# Patient Record
Sex: FEMALE | Race: WHITE | NOT HISPANIC OR LATINO | Employment: FULL TIME | URBAN - METROPOLITAN AREA
[De-identification: names, ages, dates, MRNs, and addresses within clinical notes are randomized per-mention and may not be internally consistent; named-entity substitution may affect disease eponyms.]

---

## 2017-01-04 ENCOUNTER — COMMUNICATION - HEALTHEAST (OUTPATIENT)
Dept: TELEHEALTH | Facility: CLINIC | Age: 32
End: 2017-01-04

## 2017-02-16 ENCOUNTER — COMMUNICATION - HEALTHEAST (OUTPATIENT)
Dept: FAMILY MEDICINE | Facility: CLINIC | Age: 32
End: 2017-02-16

## 2017-02-16 DIAGNOSIS — K21.00 REFLUX ESOPHAGITIS: ICD-10-CM

## 2017-04-18 ENCOUNTER — OFFICE VISIT - HEALTHEAST (OUTPATIENT)
Dept: FAMILY MEDICINE | Facility: CLINIC | Age: 32
End: 2017-04-18

## 2017-04-18 DIAGNOSIS — Z13.220 SCREENING, LIPID: ICD-10-CM

## 2017-04-18 DIAGNOSIS — E04.9 GOITER, NODULAR: ICD-10-CM

## 2017-04-18 DIAGNOSIS — E55.9 VITAMIN D DEFICIENCY: ICD-10-CM

## 2017-04-18 DIAGNOSIS — Z13.1 SCREENING FOR DIABETES MELLITUS: ICD-10-CM

## 2017-04-18 NOTE — ASSESSMENT & PLAN NOTE
Follow up ultrasound: Small subcentimeter nodules are well circumscribed and measure slightly smaller than on the prior study. No dominant nodule greater than a centimeter - improved.

## 2017-04-27 ENCOUNTER — HOSPITAL ENCOUNTER (OUTPATIENT)
Dept: ULTRASOUND IMAGING | Facility: CLINIC | Age: 32
Discharge: HOME | End: 2017-04-27
Attending: FAMILY MEDICINE

## 2017-04-27 DIAGNOSIS — E04.9 GOITER, NODULAR: ICD-10-CM

## 2017-05-02 ENCOUNTER — COMMUNICATION - HEALTHEAST (OUTPATIENT)
Dept: FAMILY MEDICINE | Facility: CLINIC | Age: 32
End: 2017-05-02

## 2017-05-15 ENCOUNTER — COMMUNICATION - HEALTHEAST (OUTPATIENT)
Dept: FAMILY MEDICINE | Facility: CLINIC | Age: 32
End: 2017-05-15

## 2017-05-15 DIAGNOSIS — K21.00 REFLUX ESOPHAGITIS: ICD-10-CM

## 2017-05-17 ENCOUNTER — OFFICE VISIT - HEALTHEAST (OUTPATIENT)
Dept: FAMILY MEDICINE | Facility: CLINIC | Age: 32
End: 2017-05-17

## 2017-05-17 ENCOUNTER — RECORDS - HEALTHEAST (OUTPATIENT)
Dept: ADMINISTRATIVE | Facility: OTHER | Age: 32
End: 2017-05-17

## 2017-05-17 DIAGNOSIS — Z13.228 SCREENING FOR METABOLIC DISORDER: ICD-10-CM

## 2017-05-17 DIAGNOSIS — Z13.21 SCREENING FOR ENDOCRINE, NUTRITIONAL, METABOLIC AND IMMUNITY DISORDER: ICD-10-CM

## 2017-05-17 DIAGNOSIS — Z13.220 SCREENING, LIPID: ICD-10-CM

## 2017-05-17 DIAGNOSIS — Z13.29 SCREENING FOR THYROID DISORDER: ICD-10-CM

## 2017-05-17 DIAGNOSIS — Z13.0 SCREENING FOR ENDOCRINE, NUTRITIONAL, METABOLIC AND IMMUNITY DISORDER: ICD-10-CM

## 2017-05-17 DIAGNOSIS — E55.9 VITAMIN D DEFICIENCY: ICD-10-CM

## 2017-05-17 DIAGNOSIS — Z13.228 SCREENING FOR ENDOCRINE, NUTRITIONAL, METABOLIC AND IMMUNITY DISORDER: ICD-10-CM

## 2017-05-17 DIAGNOSIS — Z13.29 SCREENING FOR ENDOCRINE, NUTRITIONAL, METABOLIC AND IMMUNITY DISORDER: ICD-10-CM

## 2017-05-17 DIAGNOSIS — K21.00 REFLUX ESOPHAGITIS: ICD-10-CM

## 2017-05-17 DIAGNOSIS — Z13.1 SCREENING FOR DIABETES MELLITUS: ICD-10-CM

## 2017-05-17 LAB
ALT SERPL-CCNC: 10 U/L (ref 0–45)
AST SERPL-CCNC: 13 U/L (ref 0–40)
CHOLEST SERPL-MCNC: 199 MG/DL
CREAT SERPL-MCNC: 0.81 MG/DL (ref 0.6–1.1)
FASTING STATUS PATIENT QL REPORTED: NORMAL
GFR SERPL CREATININE-BSD FRML MDRD: >60 ML/MIN/1.73M2
GLUCOSE SERPL-MCNC: 86 MG/DL (ref 70–125)
HBA1C MFR BLD: 5.2 % (ref 3.5–6)
HDLC SERPL-MCNC: 54 MG/DL
LDLC SERPL CALC-MCNC: 127 MG/DL
POTASSIUM SERPL-SCNC: 4.2 MMOL/L (ref 3.5–5)
TRIGL SERPL-MCNC: 90 MG/DL

## 2017-05-17 ASSESSMENT — MIFFLIN-ST. JEOR: SCORE: 1511.65

## 2017-06-19 ENCOUNTER — COMMUNICATION - HEALTHEAST (OUTPATIENT)
Dept: FAMILY MEDICINE | Facility: CLINIC | Age: 32
End: 2017-06-19

## 2017-06-22 ENCOUNTER — OFFICE VISIT - HEALTHEAST (OUTPATIENT)
Dept: FAMILY MEDICINE | Facility: CLINIC | Age: 32
End: 2017-06-22

## 2017-06-22 DIAGNOSIS — K21.00 REFLUX ESOPHAGITIS: ICD-10-CM

## 2017-06-22 ASSESSMENT — MIFFLIN-ST. JEOR: SCORE: 1502.12

## 2017-07-19 ENCOUNTER — OFFICE VISIT - HEALTHEAST (OUTPATIENT)
Dept: FAMILY MEDICINE | Facility: CLINIC | Age: 32
End: 2017-07-19

## 2017-07-19 DIAGNOSIS — K21.00 REFLUX ESOPHAGITIS: ICD-10-CM

## 2017-07-20 ENCOUNTER — OFFICE VISIT - HEALTHEAST (OUTPATIENT)
Dept: FAMILY MEDICINE | Facility: CLINIC | Age: 32
End: 2017-07-20

## 2017-07-20 DIAGNOSIS — F42.9 OCD (OBSESSIVE COMPULSIVE DISORDER): ICD-10-CM

## 2017-07-20 NOTE — ASSESSMENT & PLAN NOTE
Mary Manuel is a 31 y.o. female who describes feeling constantly consumed with watching the time and making sure she gets her to do list done.  She says sometimes that things have her to do list are completely ridiculous and she still becomes obsessed.  For example recently she wanted to get some new blinds in her bedroom and spent 4 hours on her phone looking for the perfect blinds.  She notices that oftentimes (but not always) her obsessions are surrounding shopping.  She feels like this is really hindering her life because she does not feel like playing with her kids or interacting with her family because she is always got something pressing on her to do list even though she feels like logically these things should not be pressing issues.    Discussed meds vs CBT.   She wanted to start with meds due to time and financial constraints.   Start lexapro 10 mg po q day. #30 - 1 rf  Follow up in one month to titrate treatment.

## 2017-08-23 ENCOUNTER — OFFICE VISIT - HEALTHEAST (OUTPATIENT)
Dept: FAMILY MEDICINE | Facility: CLINIC | Age: 32
End: 2017-08-23

## 2017-08-23 DIAGNOSIS — K21.00 REFLUX ESOPHAGITIS: ICD-10-CM

## 2017-08-23 DIAGNOSIS — F42.9 OCD (OBSESSIVE COMPULSIVE DISORDER): ICD-10-CM

## 2017-08-23 ASSESSMENT — MIFFLIN-ST. JEOR: SCORE: 1492.14

## 2017-10-05 ENCOUNTER — COMMUNICATION - HEALTHEAST (OUTPATIENT)
Dept: FAMILY MEDICINE | Facility: CLINIC | Age: 32
End: 2017-10-05

## 2017-10-31 ENCOUNTER — OFFICE VISIT - HEALTHEAST (OUTPATIENT)
Dept: FAMILY MEDICINE | Facility: CLINIC | Age: 32
End: 2017-10-31

## 2017-10-31 DIAGNOSIS — K21.00 REFLUX ESOPHAGITIS: ICD-10-CM

## 2017-10-31 DIAGNOSIS — F42.2 MIXED OBSESSIONAL THOUGHTS AND ACTS: ICD-10-CM

## 2017-10-31 NOTE — ASSESSMENT & PLAN NOTE
Feels like lexapro 10 mg daily works well for her.  She is sleeping better, she feels less like compulsively eating and is wondering if she can increase the dose as she is having no side effects.    I will prescribe Lexapro 20 mg orally per day.  Quantity 90 with no refills    Also we discussed using NAC also known as N-acetylcysteine which is known to help with compulsive behaviorsand is found over-the-counter.

## 2017-10-31 NOTE — ASSESSMENT & PLAN NOTE
Initial Weight: 192 lbs  Weight: 179 lb (81.2 kg)  Weight loss from initial: 13  % Weight loss: 6.77 %     Her weight has been plateaued for quite some time.  She has been afraid to get off of the phentermine but I think she is ready now so we will wean down to 5/7 days per week.  We are starting to wean today on October 31, 2017.    She is still afraid to eat healthy fats such as butter etc. so she is avoiding those.  I have reiterated that she will feel extremely hungry if she is not eating healthy fats to stay satiated.  She seemed to understand this and will try to implement this.    CAP PLAN - weight daily.  Control less than or equal to 180  Action 181-184  Panic 185

## 2017-10-31 NOTE — ASSESSMENT & PLAN NOTE
She still getting daily symptoms if she forgets to take her Protonix by 9 AM.  Continue Protonix orally 40 mg per day.

## 2018-01-18 ENCOUNTER — COMMUNICATION - HEALTHEAST (OUTPATIENT)
Dept: FAMILY MEDICINE | Facility: CLINIC | Age: 33
End: 2018-01-18

## 2018-01-18 DIAGNOSIS — K21.00 REFLUX ESOPHAGITIS: ICD-10-CM

## 2018-03-10 ENCOUNTER — COMMUNICATION - HEALTHEAST (OUTPATIENT)
Dept: FAMILY MEDICINE | Facility: CLINIC | Age: 33
End: 2018-03-10

## 2018-04-09 ENCOUNTER — RECORDS - HEALTHEAST (OUTPATIENT)
Dept: ADMINISTRATIVE | Facility: OTHER | Age: 33
End: 2018-04-09

## 2018-04-19 ENCOUNTER — RECORDS - HEALTHEAST (OUTPATIENT)
Dept: ADMINISTRATIVE | Facility: OTHER | Age: 33
End: 2018-04-19

## 2018-05-24 ENCOUNTER — RECORDS - HEALTHEAST (OUTPATIENT)
Dept: ADMINISTRATIVE | Facility: OTHER | Age: 33
End: 2018-05-24

## 2018-06-04 ENCOUNTER — OFFICE VISIT - HEALTHEAST (OUTPATIENT)
Dept: FAMILY MEDICINE | Facility: CLINIC | Age: 33
End: 2018-06-04

## 2018-06-04 ENCOUNTER — COMMUNICATION - HEALTHEAST (OUTPATIENT)
Dept: SCHEDULING | Facility: CLINIC | Age: 33
End: 2018-06-04

## 2018-06-04 DIAGNOSIS — J32.9 SINUSITIS: ICD-10-CM

## 2018-06-04 ASSESSMENT — MIFFLIN-ST. JEOR: SCORE: 1615.52

## 2018-06-05 ENCOUNTER — COMMUNICATION - HEALTHEAST (OUTPATIENT)
Dept: FAMILY MEDICINE | Facility: CLINIC | Age: 33
End: 2018-06-05

## 2018-06-07 ENCOUNTER — COMMUNICATION - HEALTHEAST (OUTPATIENT)
Dept: FAMILY MEDICINE | Facility: CLINIC | Age: 33
End: 2018-06-07

## 2018-06-07 DIAGNOSIS — F42.9 OBSESSIVE COMPULSIVE DISORDER: ICD-10-CM

## 2018-06-26 ENCOUNTER — RECORDS - HEALTHEAST (OUTPATIENT)
Dept: ADMINISTRATIVE | Facility: OTHER | Age: 33
End: 2018-06-26

## 2018-07-17 ENCOUNTER — OFFICE VISIT - HEALTHEAST (OUTPATIENT)
Dept: FAMILY MEDICINE | Facility: CLINIC | Age: 33
End: 2018-07-17

## 2018-07-17 DIAGNOSIS — F50.812 SEVERE BINGE-EATING DISORDER: ICD-10-CM

## 2018-07-17 DIAGNOSIS — Z13.228 SCREENING FOR METABOLIC DISORDER: ICD-10-CM

## 2018-07-17 DIAGNOSIS — E66.811 CLASS 1 DRUG-INDUCED OBESITY WITH SERIOUS COMORBIDITY AND BODY MASS INDEX (BMI) OF 34.0 TO 34.9 IN ADULT: ICD-10-CM

## 2018-07-17 DIAGNOSIS — Z13.1 SCREENING FOR DIABETES MELLITUS: ICD-10-CM

## 2018-07-17 DIAGNOSIS — Z13.220 SCREENING, LIPID: ICD-10-CM

## 2018-07-17 DIAGNOSIS — Z13.0 SCREENING, ANEMIA, DEFICIENCY, IRON: ICD-10-CM

## 2018-07-17 DIAGNOSIS — E66.1 CLASS 1 DRUG-INDUCED OBESITY WITH SERIOUS COMORBIDITY AND BODY MASS INDEX (BMI) OF 34.0 TO 34.9 IN ADULT: ICD-10-CM

## 2018-07-17 DIAGNOSIS — E55.9 VITAMIN D DEFICIENCY: ICD-10-CM

## 2018-07-17 DIAGNOSIS — Z13.29 SCREENING FOR THYROID DISORDER: ICD-10-CM

## 2018-07-17 ASSESSMENT — MIFFLIN-ST. JEOR: SCORE: 1629.82

## 2018-07-17 NOTE — ASSESSMENT & PLAN NOTE
LEXAPRO 20 mg orally per day -anxiety and obsessive-compulsive disorder  7/2017-7/2018-weight gain of 30 pounds  Dc 7/17/2018     ZOLOFT 25 mg orally per day-anxiety and OCD may increase to 50 mg after the first week  I would like to use this medication instead of Lexapro due to weight gain on Lexapro.  Left also has a known indication for binge eating disorder.    Eventually I might like to add Vyvanse which is also known to be helpful for binge eating disorder.

## 2018-07-17 NOTE — ASSESSMENT & PLAN NOTE
Dx: BMI 34.63, in the setting of multiple weight related comorbid conditions including: Chronic reflux esophagitis, severe binge eating disorder, and vitamin D deficiency    Initial Weight: 208.1 lbs bmi 34.63, 7/17/2018 (restart)  Weight: 208 lb 1.6 oz 7/17/2018   Weight loss from initial: 0  % Weight loss: 0 %  Body Fat %: 44.2       Are you experiencing any side effects to the medications:  Yes gaining weight since starting lexapro 7/2017.   Hunger control:Poor.  She says that every night at 9 PM she is eating everything she can find -binging nightly.  She says she will eat more than 1000 cass in the evenings.  Otherwise she is actually eating fairly well.  Exercise was discussed: She is regularly exercising.  Taking supplements: Not discussed due to time constraints.  Discussed journaling food: She is doing this intermittently.  Patient is pleased with the current results: No  The patient is following the nutrition plan: Yes during the day but in the evenings she is binging and that is making it so that which she does during the day is not helpful  Barriers to losing weight:    lexapro -she has gained 30 pounds since starting Lexapro in July 2017.    PLAN    Mary tells me that despite trying really hard to eat healthy and exercising she continues to have difficulty with her weight.  Got to the point that she feels she can no longer control it at all.  She notes that every night she is eating whatever she can get her hands on and sometimes this is more than her daily need for calories.  Because of this we have a new diagnosis of binge eating disorder today.    I plan for her is toget her off the Lexapro because I think the Lexapro is causing significant weight gain.  She has gained 30 pounds since she started it.  However, she continues to have anxiety and OCD type symptoms that are improved by the Lexapro so I have recommended we start Zoloft instead.  She is to start the Zoloft tomorrow evening at 25 mg and  increase as tolerated to 50 mg daily.  We will visit again in 1 month and if she is stable on the Lexapro we might consider adding Wellbutrin, Vyvanse, and or N-acetylcysteine to this regimen.  We could also use phentermine but she has had mixed results with that in the past.    Because of the significant weight gain of 30 pounds I have recommended also re-evaluating laboratory studies to look for thyroid abnormalities, metabolic syndrome and prediabetes.    LEXAPRO 20 mg orally per day -anxiety and obsessive-compulsive disorder  7/2017-7/2018-weight gain of 30 pounds  Dc 7/17/2018    ZOLOFT 25 mg orally per day-anxiety and OCD may increase to 50 mg after the first week  I would like to use this medication instead of Lexapro due to weight gain on Lexapro.    PHENTERMINE 37.5 MG PO Q DAY - For weight loss  4/19/2016-9/2016-initially lost 15 pounds but then even though she was still taking the phentermine she gained 7 pounds.  9/2016-12/2016 she did not take phentermine and gained 10 pounds during this time  1/2017 -4/2017 -she did not take phentermine and lost 15 pounds  4/2017 - 11/2017 -weight was stable and went up and down 5 pounds despite the use of phentermine during this time  11/2017 -7/2018-gained 30 pounds probably due to use of Lexapro during this time  7/2018-we will consider use of phentermine again in the future but at this time I would like to start Zoloft and discontinue Lexapro to see how she does once she stabilized on the Zoloft we can consider adding or changing medications.    Follow up in one month.

## 2018-07-18 ENCOUNTER — AMBULATORY - HEALTHEAST (OUTPATIENT)
Dept: LAB | Facility: CLINIC | Age: 33
End: 2018-07-18

## 2018-07-18 DIAGNOSIS — E55.9 VITAMIN D DEFICIENCY: ICD-10-CM

## 2018-07-18 DIAGNOSIS — Z13.1 SCREENING FOR DIABETES MELLITUS: ICD-10-CM

## 2018-07-18 DIAGNOSIS — Z13.29 SCREENING FOR THYROID DISORDER: ICD-10-CM

## 2018-07-18 DIAGNOSIS — Z13.0 SCREENING, ANEMIA, DEFICIENCY, IRON: ICD-10-CM

## 2018-07-18 DIAGNOSIS — Z13.228 SCREENING FOR METABOLIC DISORDER: ICD-10-CM

## 2018-07-18 DIAGNOSIS — Z13.220 SCREENING, LIPID: ICD-10-CM

## 2018-07-18 LAB
ALBUMIN SERPL-MCNC: 3.6 G/DL (ref 3.5–5)
ALP SERPL-CCNC: 41 U/L (ref 45–120)
ALT SERPL W P-5'-P-CCNC: 11 U/L (ref 0–45)
ALT SERPL-CCNC: 11 U/L (ref 0–45)
ANION GAP SERPL CALCULATED.3IONS-SCNC: 9 MMOL/L (ref 5–18)
AST SERPL W P-5'-P-CCNC: 18 U/L (ref 0–40)
AST SERPL-CCNC: 18 U/L (ref 0–40)
BASOPHILS # BLD AUTO: 0 THOU/UL (ref 0–0.2)
BASOPHILS NFR BLD AUTO: 1 % (ref 0–2)
BILIRUB SERPL-MCNC: 0.5 MG/DL (ref 0–1)
BUN SERPL-MCNC: 14 MG/DL (ref 8–22)
CALCIUM SERPL-MCNC: 9.3 MG/DL (ref 8.5–10.5)
CHLORIDE BLD-SCNC: 107 MMOL/L (ref 98–107)
CHOLEST SERPL-MCNC: 183 MG/DL
CO2 SERPL-SCNC: 24 MMOL/L (ref 22–31)
CREAT SERPL-MCNC: 0.81 MG/DL (ref 0.6–1.1)
CREAT SERPL-MCNC: 0.81 MG/DL (ref 0.6–1.1)
EOSINOPHIL # BLD AUTO: 0.1 THOU/UL (ref 0–0.4)
EOSINOPHIL NFR BLD AUTO: 2 % (ref 0–6)
ERYTHROCYTE [DISTWIDTH] IN BLOOD BY AUTOMATED COUNT: 11.5 % (ref 11–14.5)
FASTING STATUS PATIENT QL REPORTED: YES
GFR SERPL CREATININE-BSD FRML MDRD: >60 ML/MIN/1.73M2
GFR SERPL CREATININE-BSD FRML MDRD: >60 ML/MIN/1.73M2
GLUCOSE BLD-MCNC: 93 MG/DL (ref 70–125)
GLUCOSE SERPL-MCNC: 93 MG/DL (ref 70–125)
HCT VFR BLD AUTO: 36.7 % (ref 35–47)
HDLC SERPL-MCNC: 58 MG/DL
HGB BLD-MCNC: 12.2 G/DL (ref 12–16)
LDLC SERPL CALC-MCNC: 108 MG/DL
LYMPHOCYTES # BLD AUTO: 1.8 THOU/UL (ref 0.8–4.4)
LYMPHOCYTES NFR BLD AUTO: 34 % (ref 20–40)
MCH RBC QN AUTO: 31.4 PG (ref 27–34)
MCHC RBC AUTO-ENTMCNC: 33.2 G/DL (ref 32–36)
MCV RBC AUTO: 95 FL (ref 80–100)
MONOCYTES # BLD AUTO: 0.4 THOU/UL (ref 0–0.9)
MONOCYTES NFR BLD AUTO: 8 % (ref 2–10)
NEUTROPHILS # BLD AUTO: 2.9 THOU/UL (ref 2–7.7)
NEUTROPHILS NFR BLD AUTO: 55 % (ref 50–70)
PLATELET # BLD AUTO: 211 THOU/UL (ref 140–440)
PMV BLD AUTO: 8.6 FL (ref 7–10)
POTASSIUM BLD-SCNC: 4.3 MMOL/L (ref 3.5–5)
POTASSIUM SERPL-SCNC: 4.3 MMOL/L (ref 3.5–5)
PROT SERPL-MCNC: 6.7 G/DL (ref 6–8)
RBC # BLD AUTO: 3.89 MILL/UL (ref 3.8–5.4)
SODIUM SERPL-SCNC: 140 MMOL/L (ref 136–145)
TRIGL SERPL-MCNC: 83 MG/DL
TSH SERPL DL<=0.005 MIU/L-ACNC: 0.43 UIU/ML (ref 0.3–5)
WBC: 5.2 THOU/UL (ref 4–11)

## 2018-07-19 LAB
25(OH)D3 SERPL-MCNC: 41.7 NG/ML (ref 30–80)
25(OH)D3 SERPL-MCNC: 41.7 NG/ML (ref 30–80)
HBA1C MFR BLD: 5.1 % (ref 4.2–6.1)

## 2018-08-06 ENCOUNTER — COMMUNICATION - HEALTHEAST (OUTPATIENT)
Dept: FAMILY MEDICINE | Facility: CLINIC | Age: 33
End: 2018-08-06

## 2018-08-15 ENCOUNTER — OFFICE VISIT - HEALTHEAST (OUTPATIENT)
Dept: FAMILY MEDICINE | Facility: CLINIC | Age: 33
End: 2018-08-15

## 2018-08-15 DIAGNOSIS — E66.811 CLASS 1 DRUG-INDUCED OBESITY WITH SERIOUS COMORBIDITY AND BODY MASS INDEX (BMI) OF 34.0 TO 34.9 IN ADULT: ICD-10-CM

## 2018-08-15 DIAGNOSIS — E66.1 CLASS 1 DRUG-INDUCED OBESITY WITH SERIOUS COMORBIDITY AND BODY MASS INDEX (BMI) OF 34.0 TO 34.9 IN ADULT: ICD-10-CM

## 2018-08-15 DIAGNOSIS — K21.00 REFLUX ESOPHAGITIS: ICD-10-CM

## 2018-08-15 ASSESSMENT — MIFFLIN-ST. JEOR: SCORE: 1620.29

## 2018-08-15 NOTE — ASSESSMENT & PLAN NOTE
Dx: BMI 34.63, in the setting of multiple weight related comorbid conditions including:Chronic reflux esophagitis, severe binge eating disorder, and vitamin D deficiency     Initial Weight: 208.1 lbs bmi 34.63, 7/17/2018 (restart)  Weight: 208 lb 1.6 oz 7/17/2018   Weight: 206 lb (93.4 kg) bmi 34.28 8/15/2018   Weight loss from initial: 2.1  % Weight loss: 1.01 %  Body Fat %: 44.2         Are you experiencing any side effects to the medications:  no  Hunger control: Poor. Daily tendency to want to binge  Exercise was discussed: She is regularly exercising.  Taking supplements: Not discussed dueto time constraints.  Discussed journaling food: She is doing this intermittently.  Patient is pleased with the current results: No  The patient is following the nutrition plan: Yes during the day but in the evenings she is binging and that is making it so that which she does during the day is not helpful  Barriers to losing weight:    lexapro -she has gained 30 pounds since starting Lexapro in July 2017 -this was discontinued as of July 2018.     PLAN  LOST WEIGHT, REGAINED AND NOW ACTIVELY LOSING AGAIN     Labs were reviewed today and all appear normal.  Does not appear that she has prediabetes metabolic syndrome or thyroid abnormalities.    She did get off the lexapro and started the zoloft and it is going well.  She feels her mood is good.  She is not happy with her appetite control and feels like she needs to work really hard to avoid binging on a daily basis.  We discussed a trial of Wellbutrin.  She would like to start that.  I sent over prescription for her today.  She will take the Zoloft in the evenings and Wellbutrin in the mornings.  She says she has no family or personal history of seizure disorder.  We did discuss risks and benefits and at this time she would like to give the Wellbutrin shot.  Because of her eating disorder I have recommended that she visit the [facility] to address this.  Today she says she  will make an intake an appointment with them.    In the future we could consider Contrave or Vyvanse or N-acetylcysteine.  I want to keep in mind that she did try phentermine in the past and has mixed results with that so I am not sure what Vyvanse or repeat course of phentermine would be helpful.     LEXAPRO 20 mg orally per day -anxiety and obsessive-compulsive disorder  7/2017-7/2018-weight gain of 30 pounds  Dc 7/17/2018      ZOLOFT 50 mg orally per day-anxiety and OCD  7/17/2018 - 8/15/2018 - doing well feels this is working. - will continue    WELLBUTRIN 150 XL DAILY   8/15/2018 - START       PHENTERMINE 37.5 MG PO Q DAY - For weight loss  4/19/2016-9/2016-initially lost 15 pounds but then even though she was still taking the phentermine she gained 7 pounds.  9/2016-12/2016 she did not take phentermine and gained 10 pounds during this time  1/2017 -4/2017 -she did not take phentermine and lost 15 pounds  4/2017 - 11/2017 -weight was stable and went up and down 5 pounds despite the use of phentermine during this time  11/2017 -7/2018-gained 30 pounds probably due to use of Lexapro during this time  7/2018-we will consider use of phentermine again in the future but at this time I would like to start Zoloft and discontinue Lexapro to see how she does once she stabilized on the Zoloft we can consider adding or changing medications.     Follow up in one month.

## 2018-08-15 NOTE — ASSESSMENT & PLAN NOTE
4/17/2018 she saw gastroenterology and they thought initially that she has GERD versus eosinophilic esophagitis.  She was prescribed Nexium. Then, her EGD was noted to be normal and they said she had no GERD, Arrington's or eosinophilic esophagitis after the EGD was available. GI recommened a ph study which then showed pretty severe gerd so she is taking protonix jtwice daily.  She is considered a candidate for a nissen and or a new procedure with a magnet to tighten the lower esophageal sphincter. .  Weight loss is still recommended to help her overall health and these sx. See bmi in problem list.

## 2018-09-12 ENCOUNTER — OFFICE VISIT - HEALTHEAST (OUTPATIENT)
Dept: FAMILY MEDICINE | Facility: CLINIC | Age: 33
End: 2018-09-12

## 2018-09-12 DIAGNOSIS — E66.1 CLASS 1 DRUG-INDUCED OBESITY WITH SERIOUS COMORBIDITY AND BODY MASS INDEX (BMI) OF 34.0 TO 34.9 IN ADULT: ICD-10-CM

## 2018-09-12 DIAGNOSIS — E66.811 CLASS 1 DRUG-INDUCED OBESITY WITH SERIOUS COMORBIDITY AND BODY MASS INDEX (BMI) OF 34.0 TO 34.9 IN ADULT: ICD-10-CM

## 2018-09-12 ASSESSMENT — MIFFLIN-ST. JEOR: SCORE: 1606.68

## 2018-09-12 NOTE — ASSESSMENT & PLAN NOTE
Dx: BMI 34.63, in the setting of multiple weight related comorbid conditions including:Chronic reflux esophagitis, severe binge eating disorder, and vitamin D deficiency      Initial Weight: 208.1 lbs bmi 34.63, 7/17/2018 (restart)  Weight: 208 lb 1.6 oz 7/17/2018   Weight: 206 lb (93.4 kg) bmi 34.28 8/15/2018   Weight: 203 lb (92.1 kg)bmi 33.78 9/12/2018   Weight loss from initial: 5.1  % Weight loss: 2.45 %           Are you experiencing any side effects to the medications:  no  Hunger control: Poor. Daily tendency to want to binge  Exercise was discussed: She is regularly exercising.  Taking supplements: Not discussed due to time constraints.  Discussed journaling food: She is doing this intermittently.  Patient is pleased with the current results: No  The patient is following the nutrition plan: Yes she is using an mikael called ZigaVite and finds it motivational and helpful to stay on track with eating.   Barriers to losing weight:    lexapro -she has gained 30 pounds since starting Lexapro in July 2017 -this was discontinued as of July 2018.      PLAN  LOST WEIGHT, REGAINED AND NOW ACTIVELY LOSING AGAIN     CAP plan discussed today - plan to reset once she loses more weight.  Less than or equal to 208 pounds-control  209 pounds- action  210 pounds-panic    She is still liking the zoloft and wants to continue that. She feels like the combination of Zoloft and Wellbutrin is really perfect for her.  She says she has more patience with her kids, less agitation overall, she feels an overall sense of well-being and has been sleeping better.  She does not even feel like she is eating as much in the evenings anymore.  She is able to fast for 12 hours overnight.    She notes that she does not get enough sleep.  Currently she gets 6 hours of sleep most.  Her goal is to start to increase this slowly.  By next time she is hoping she can try to get 6hrs and 15 minutes of sleep per night and she plans to track this with her  Lizabeth.  still feels like she has some disordered eating and is still open to the idea of going to the [facility] to address this.  However, she is not yet ready.      In the future we could consider Contrave or Vyvanse or N-acetylcysteine.  I want to keep in mind that she did try phentermine in the past and has mixed results with that so I am not sure what Vyvanse or repeat course of phentermine would be helpful.      LEXAPRO 20 mg orally per day -anxiety and obsessive-compulsive disorder  7/2017-7/2018-weight gain of 30 pounds  Dc 7/17/2018       ZOLOFT 50 mg orally per day-anxiety and OCD  7/17/2018 - 9/12/2018  - doing well feels this is working. - will continue     WELLBUTRIN 150 XL DAILY   8/15/2018 - 9/12/2018 -  works really well, weight down and she feels herappetite is much better in the evenings and her mood is much improved.       PHENTERMINE 37.5 MG PO Q DAY - For weight loss  4/19/2016-9/2016-initially lost 15 pounds but then even though she was still taking the phentermine she gained 7 pounds.  9/2016-12/2016 she did not take phentermine and gained 10 pounds during this time  1/2017 -4/2017 -she did nottake phentermine and lost 15 pounds  4/2017 - 11/2017 -weight was stable and went up and down 5 pounds despite the use of phentermine during this time  11/2017 -7/2018-gained 30 pounds probably due to use of Lexapro during this time  7/2018-we will consider use of phentermine again in thefuture but at this time I would like to start Zoloft and discontinue Lexapro to see how she does once she stabilized on the Zoloft we can consider adding or changing medications.      Follow up in one month.

## 2018-11-29 ENCOUNTER — OFFICE VISIT - HEALTHEAST (OUTPATIENT)
Dept: FAMILY MEDICINE | Facility: CLINIC | Age: 33
End: 2018-11-29

## 2018-11-29 ENCOUNTER — COMMUNICATION - HEALTHEAST (OUTPATIENT)
Dept: ADMINISTRATIVE | Facility: CLINIC | Age: 33
End: 2018-11-29

## 2018-11-29 DIAGNOSIS — Z23 NEED FOR VACCINATION: ICD-10-CM

## 2018-11-29 DIAGNOSIS — E66.1 CLASS 1 DRUG-INDUCED OBESITY WITH SERIOUS COMORBIDITY AND BODY MASS INDEX (BMI) OF 33.0 TO 33.9 IN ADULT: ICD-10-CM

## 2018-11-29 DIAGNOSIS — E04.9 GOITER, NODULAR: ICD-10-CM

## 2018-11-29 DIAGNOSIS — E66.811 CLASS 1 DRUG-INDUCED OBESITY WITH SERIOUS COMORBIDITY AND BODY MASS INDEX (BMI) OF 33.0 TO 33.9 IN ADULT: ICD-10-CM

## 2018-11-29 ASSESSMENT — MIFFLIN-ST. JEOR: SCORE: 1597.16

## 2018-11-29 NOTE — ASSESSMENT & PLAN NOTE
Dx: BMI 34.63, in the setting of multiple weight related comorbid conditions including: Chronic reflux esophagitis, severe binge eating disorder, and vitamin D deficiency      Initial Weight: 208.1 lbs bmi 34.63, 7/17/2018 (restart)  Weight: 208 lb 1.6 oz 7/17/2018   Weight: 206 lb (93.4 kg) bmi 34.28 8/15/2018   Weight: 203 lb (92.1 kg) bmi 33.78 9/12/2018   Weight: 200 lb 14.4 oz (91.1 kg) bmi 33.43, 11/29/2018   Weight loss from initial: 7.2  % Weight loss: 3.46 %    LEXAPRO 20 mg orally per day -anxiety and obsessive-compulsive disorder  7/2017-7/2018-weight gain of 30 pounds  Dc 7/17/2018       ZOLOFT 50 mg orally per day-anxiety and OCD  7/17/2018 - 11/29/2018  - doing well feels this is working. - will continue     WELLBUTRIN 150 XL DAILY   8/15/2018 - 11/29/2018 -  works really well, weight down and she feels her appetite is much better in the evenings and her mood is much improved.       PHENTERMINE 37.5 MG PO Q DAY - For weight loss  4/19/2016-9/2016-initially lost 15 pounds but then even though she was still taking the phentermine she gained 7 pounds.  9/2016-she did not take phentermine and gained 10 pounds during this time  1/2017 -4/2017 -she did not take phentermine and lost 15 pounds  4/2017 - 11/2017 -weight was stable and went up and down 5 pounds despite the use of phentermine during this time  11/2017 -7/2018-gained 30 pounds probably due to use of Lexapro during this time  7/2018-we will consider use of phentermine again in the future but at this time I would like to start Zoloft and discontinue Lexapro to see how she does once she stabilized on the Zoloft we can consider adding or changing medications.                PLAN  WEIGHT LOSS PHASE - LOST WEIGHT, REGAINED AND NOW ACTIVELY LOSING AGAIN      CAP PLAN IMPLEMENTED  Less than or equal to 202 lbs- control  Between 203-205 lbs - action  Greater than or equal to 206 - panic     Continue zoloft and wellbutrin.      In the future we could  consider Contrave or Vyvanse or N-acetylcysteine or belviq instead of zoloft.  I want to keep in mind that she did try phentermine in the past and has mixed results with that so I am not sure what Vyvanse or repeat course of phentermine would be helpful.    FOLLOW UP IN ONE MONTH

## 2018-11-29 NOTE — ASSESSMENT & PLAN NOTE
Ultrasound for follow-up is ordered.  She has not seen the endocrinologist, Dr. Harris since September 2016 and so follow-up was ordered as well.

## 2018-12-13 ENCOUNTER — TRANSFERRED RECORDS (OUTPATIENT)
Dept: HEALTH INFORMATION MANAGEMENT | Facility: CLINIC | Age: 33
End: 2018-12-13

## 2018-12-13 ENCOUNTER — HOSPITAL ENCOUNTER (OUTPATIENT)
Dept: ULTRASOUND IMAGING | Facility: CLINIC | Age: 33
Discharge: HOME | End: 2018-12-13
Attending: FAMILY MEDICINE

## 2018-12-13 DIAGNOSIS — E04.9 GOITER, NODULAR: ICD-10-CM

## 2019-01-16 ENCOUNTER — OFFICE VISIT - HEALTHEAST (OUTPATIENT)
Dept: FAMILY MEDICINE | Facility: CLINIC | Age: 34
End: 2019-01-16

## 2019-01-16 DIAGNOSIS — K21.00 REFLUX ESOPHAGITIS: ICD-10-CM

## 2019-01-16 DIAGNOSIS — E66.1 CLASS 1 DRUG-INDUCED OBESITY WITH SERIOUS COMORBIDITY AND BODY MASS INDEX (BMI) OF 33.0 TO 33.9 IN ADULT: ICD-10-CM

## 2019-01-16 DIAGNOSIS — E66.811 CLASS 1 DRUG-INDUCED OBESITY WITH SERIOUS COMORBIDITY AND BODY MASS INDEX (BMI) OF 33.0 TO 33.9 IN ADULT: ICD-10-CM

## 2019-01-16 ASSESSMENT — MIFFLIN-ST. JEOR: SCORE: 1597.61

## 2019-01-16 NOTE — ASSESSMENT & PLAN NOTE
Dx: BMI 34.63, in the setting of multiple weight related comorbid conditions including: Chronic reflux esophagitis, severe binge eating disorder, and vitamin D deficiency      Initial Weight: 208.1 lbs bmi 34.63, 7/17/2018 (restart)  Weight: 208 lb 1.6 oz 7/17/2018   Weight: 206 lb (93.4 kg) bmi 34.28 8/15/2018   Weight: 203 lb (92.1 kg) bmi 33.78 9/12/2018   Weight: 200 lb 14.4 oz (91.1 kg) bmi 33.43, 11/29/2018   Weight: 201 lb (91.2 kg) bmi 33.45, 1/16/2019   Weight loss from initial: 7.1  % Weight loss: 3.41 %       LEXAPRO 20 mg orally per day -anxiety and obsessive-compulsive disorder  7/2017-7/2018-weight gain of 30 pounds  Dc 7/17/2018       ZOLOFT 50 mg orally per day-anxiety and OCD  7/17/2018 - 1/16/2019   - doing well feels this is working. - will continue     WELLBUTRIN 150 XL DAILY   8/15/2018 - 1/16/2019 -  works really well, weight down and she feels her appetite is much better in the evenings and her mood is much improved.       PHENTERMINE 37.5 MG PO Q DAY - For weight loss  4/19/2016-9/2016-initially lost 15 pounds but then even though she was still taking the phentermine she gained 7 pounds.  -12/2016 she did not take phentermine and gained 10 pounds during this time  1/2017 -4/2017 -she did not take phentermine and lost 15 pounds  4/2017 - 11/2017 -weight was stable and went up and down 5 pounds despite the use of phentermine during this time  11/2017 -7/2018-gained 30pounds probably due to use of Lexapro during this time  7/2018-we will consider use of phentermine again in the future but at this time I would like to start Zoloft and discontinue Lexapro to see how she does once she stabilized on the Zoloft we can consider adding or changing medications.    saxenda   1/16/2019 - discussed see notes below from this date.        PLAN  WEIGHT MAINTENANCE PHASE     CAP PLAN IMPLEMENTED   Less than or equal to 203 lbs- control  Between 204-206 lbs - action  Greater than or equal to 207 -  panic      Continue zoloft and wellbutrin.   Discussed saxenda briefly and she is interested especially because her GERD is really becoming problematic and she is starting to consider surgical intervention for it.-I did ask her to check with her endocrinologist because she has been watching that goiter and they want to make sure that this would not be contraindicated, also she is to check on the website and specifically look at cost and insurance coverage and risks/benefits.  If all of this checks out on her and we can proceed and discuss this further at her next visit.    She tells me gerd is worsening and she now takes gerd two times a day through GI doc. They are considering a surgical intervention.      In the future we could consider Contrave or Vyvanse or N-acetylcysteine or belviq instead of zoloft.  I want to keep in mind that she did try phentermine in the past and has mixed results with that so I am not sure what Vyvanse or repeat course of phentermine would be helpful.     FOLLOW UP IN ONE MONTH

## 2019-01-16 NOTE — ASSESSMENT & PLAN NOTE
She tells me that her GERD is worsening.  She is now taking her Protonix twice a day instead of once a day and is considering a surgical procedure for her GERD.  Her gastroenterologist has advised at least 20 pound weight loss.

## 2019-02-05 ENCOUNTER — COMMUNICATION - HEALTHEAST (OUTPATIENT)
Dept: FAMILY MEDICINE | Facility: CLINIC | Age: 34
End: 2019-02-05

## 2019-02-19 ENCOUNTER — OFFICE VISIT - HEALTHEAST (OUTPATIENT)
Dept: FAMILY MEDICINE | Facility: CLINIC | Age: 34
End: 2019-02-19

## 2019-02-19 DIAGNOSIS — M25.512 PAIN IN JOINT OF LEFT SHOULDER: ICD-10-CM

## 2019-02-19 DIAGNOSIS — F42.2 MIXED OBSESSIONAL THOUGHTS AND ACTS: ICD-10-CM

## 2019-02-19 DIAGNOSIS — E66.1 CLASS 1 DRUG-INDUCED OBESITY WITH SERIOUS COMORBIDITY AND BODY MASS INDEX (BMI) OF 33.0 TO 33.9 IN ADULT: ICD-10-CM

## 2019-02-19 DIAGNOSIS — E55.9 VITAMIN D DEFICIENCY: ICD-10-CM

## 2019-02-19 DIAGNOSIS — K21.00 REFLUX ESOPHAGITIS: ICD-10-CM

## 2019-02-19 DIAGNOSIS — E66.811 CLASS 1 DRUG-INDUCED OBESITY WITH SERIOUS COMORBIDITY AND BODY MASS INDEX (BMI) OF 33.0 TO 33.9 IN ADULT: ICD-10-CM

## 2019-02-19 ASSESSMENT — MIFFLIN-ST. JEOR: SCORE: 1588.54

## 2019-02-19 NOTE — ASSESSMENT & PLAN NOTE
She says the Zoloft 50 mg orally per day and Wellbutrin 150 mg extended release per day are workingextremely well for her OCD.  She has noted that her family and her coworkers all feels she is less irritable and she is feeling like she is able to let things go more easily.    Continue Zoloft and Wellbutrin-we did talk abouttitrating the dose but she likes the current dose at this point.

## 2019-02-19 NOTE — ASSESSMENT & PLAN NOTE
She says she is fine as long as she is taking her reflux medications but she does need to be consistent with those otherwise she has symptoms.

## 2019-02-19 NOTE — ASSESSMENT & PLAN NOTE
Dx: BMI 34.63, in the setting of multiple weight related comorbid conditions including: Chronic reflux esophagitis, severe binge eating disorder, and vitamin D deficiency      Initial Weight: 208.1 lbs bmi 34.63, 7/17/2018 (restart)  Weight: 208 lb 1.6 oz 7/17/2018   Weight: 206 lb (93.4 kg) bmi 34.28 8/15/2018   Weight: 203 lb (92.1 kg) bmi 33.78 9/12/2018   Weight: 200 lb 14.4 oz (91.1 kg) bmi 33.43, 11/29/2018   Weight: 201 lb (91.2 kg) bmi 33.45, 1/16/2019   Weight: 199 lb (90.3 kg) bmi 33.12, 2/19/2019   Weight loss from initial: 9.1  % Weight loss: 4.37 %        LEXAPRO 20 mg orally per day -anxiety and obsessive-compulsive disorder  7/2017-7/2018-weight gain of 30 pounds  Dc 7/17/2018       ZOLOFT 50 mg orally per day-anxiety and OCD  7/17/2018 - 2/19/2019    - doing well feels this is working. - will continue     WELLBUTRIN 150 XL DAILY   8/15/2018 - 2/19/2019  -  works really well, weight down and she feels her appetite is much better in the evenings and her mood is much improved.       PHENTERMINE 37.5 MG PO Q DAY - For weight loss  4/19/2016-9/2016-initially lost 15 pounds but then even though she was still taking the phentermine she gained 7 pounds.  9/2016-12/2016 she did not take phentermine and gained 10 pounds during this time  1/2017 -4/2017 -she did not take phentermine and lost 15 pounds  4/2017 - 11/2017 -weight was stable and went up and down 5 pounds despite the use of phentermine during this time  11/2017 -7/2018-gained 30 pounds probably due to use of Lexapro during this time  7/2018-we will consider use of phentermine again in the future but at this time I would like to start Zoloft and discontinue Lexapro to see how she does once she stabilized on the Zoloft we can consider adding or changing medications.     saxenda   1/16/2019 -she did look into this and thought about this at length.  She decided not to do it because of her thyroid abnormality and she is worried about potential for  thyroid medullary carcinoma with this drug.        PLAN  WEIGHT MAINTENANCE PHASE (BMI DOWN FROM 34 TO 33)     CAP PLAN IMPLEMENTED  Less than or equal to 200 lbs- control  Between 201-203 lbs - action  Greater than or equal to 204 - panic      Continue zoloft and wellbutrin same dose.      She tells me gerd is worsening and she now takes gerd two times a day through GI doc. They are considering a surgical intervention.      In the future we could consider Contrave or Vyvanse or N-acetylcysteine or belviq instead of zoloft.  I want to keep in mind that she did try phentermine in the past and has mixed results with that so I am not sure what Vyvanse or repeat course of phentermine would be helpful.     Goal-this month she would like to try to increase her vegetables.  Also restart fish oil, vitamin D and chromium    Future labs-check vitamin D in May or June 2019.  She is starting a supplement today 2/19/19     FOLLOW UP IN ONE MONTH

## 2019-02-19 NOTE — ASSESSMENT & PLAN NOTE
She has not been taking her vitamin D lately.  She would like to restart her vitamin D supplement and plans to start vitamin D 5000 international units daily.  Plan to recheck in May or June 2019.

## 2019-07-30 ENCOUNTER — COMMUNICATION - HEALTHEAST (OUTPATIENT)
Dept: FAMILY MEDICINE | Facility: CLINIC | Age: 34
End: 2019-07-30

## 2019-07-30 DIAGNOSIS — F42.2 MIXED OBSESSIONAL THOUGHTS AND ACTS: ICD-10-CM

## 2019-08-07 ENCOUNTER — OFFICE VISIT - HEALTHEAST (OUTPATIENT)
Dept: FAMILY MEDICINE | Facility: CLINIC | Age: 34
End: 2019-08-07

## 2019-08-07 ENCOUNTER — COMMUNICATION - HEALTHEAST (OUTPATIENT)
Dept: FAMILY MEDICINE | Facility: CLINIC | Age: 34
End: 2019-08-07

## 2019-08-07 ENCOUNTER — COMMUNICATION - HEALTHEAST (OUTPATIENT)
Dept: SCHEDULING | Facility: CLINIC | Age: 34
End: 2019-08-07

## 2019-08-07 DIAGNOSIS — G43.909 MIGRAINE WITHOUT STATUS MIGRAINOSUS, NOT INTRACTABLE, UNSPECIFIED MIGRAINE TYPE: ICD-10-CM

## 2019-08-07 DIAGNOSIS — G43.009 MIGRAINE WITHOUT AURA AND WITHOUT STATUS MIGRAINOSUS, NOT INTRACTABLE: ICD-10-CM

## 2019-08-07 DIAGNOSIS — E66.812 CLASS 2 OBESITY DUE TO EXCESS CALORIES WITHOUT SERIOUS COMORBIDITY WITH BODY MASS INDEX (BMI) OF 35.0 TO 35.9 IN ADULT: ICD-10-CM

## 2019-08-07 DIAGNOSIS — E66.09 CLASS 2 OBESITY DUE TO EXCESS CALORIES WITHOUT SERIOUS COMORBIDITY WITH BODY MASS INDEX (BMI) OF 35.0 TO 35.9 IN ADULT: ICD-10-CM

## 2019-08-07 ASSESSMENT — MIFFLIN-ST. JEOR: SCORE: 1653.4

## 2019-08-07 NOTE — ASSESSMENT & PLAN NOTE
Discussed that her weight makes her feel stressed and she notes that despite her best efforts her weight is trending up. I suggested looking at the 24 week intensive lifestyle program. She liked this idea and will consider starting this.

## 2019-08-07 NOTE — ASSESSMENT & PLAN NOTE
Start prn imitrex 100 mg po q day.  In the remote past she also used amitriptyline to help control her migraines, but she feels that is into necessary now. Also would like to lose weight because she feels her weight is stressful to her and stress triggers her migraines.

## 2019-10-03 ENCOUNTER — RECORDS - HEALTHEAST (OUTPATIENT)
Dept: ADMINISTRATIVE | Facility: OTHER | Age: 34
End: 2019-10-03

## 2019-10-17 ENCOUNTER — COMMUNICATION - HEALTHEAST (OUTPATIENT)
Dept: FAMILY MEDICINE | Facility: CLINIC | Age: 34
End: 2019-10-17

## 2019-10-23 ENCOUNTER — COMMUNICATION - HEALTHEAST (OUTPATIENT)
Dept: FAMILY MEDICINE | Facility: CLINIC | Age: 34
End: 2019-10-23

## 2019-10-23 DIAGNOSIS — Z82.69 FAMILY HISTORY OF CONNECTIVE TISSUE DISEASE: ICD-10-CM

## 2019-10-24 ENCOUNTER — COMMUNICATION - HEALTHEAST (OUTPATIENT)
Dept: FAMILY MEDICINE | Facility: CLINIC | Age: 34
End: 2019-10-24

## 2019-10-31 ENCOUNTER — COMMUNICATION - HEALTHEAST (OUTPATIENT)
Dept: FAMILY MEDICINE | Facility: CLINIC | Age: 34
End: 2019-10-31

## 2019-10-31 DIAGNOSIS — F42.9 OBSESSIVE COMPULSIVE DISORDER: ICD-10-CM

## 2019-11-26 ENCOUNTER — RECORDS - HEALTHEAST (OUTPATIENT)
Dept: ADMINISTRATIVE | Facility: OTHER | Age: 34
End: 2019-11-26

## 2019-12-18 ENCOUNTER — COMMUNICATION - HEALTHEAST (OUTPATIENT)
Dept: FAMILY MEDICINE | Facility: CLINIC | Age: 34
End: 2019-12-18

## 2019-12-23 ENCOUNTER — COMMUNICATION - HEALTHEAST (OUTPATIENT)
Dept: FAMILY MEDICINE | Facility: CLINIC | Age: 34
End: 2019-12-23

## 2019-12-23 DIAGNOSIS — F42.2 MIXED OBSESSIONAL THOUGHTS AND ACTS: ICD-10-CM

## 2019-12-23 DIAGNOSIS — F42.9 OBSESSIVE COMPULSIVE DISORDER: ICD-10-CM

## 2019-12-23 DIAGNOSIS — G43.909 MIGRAINE WITHOUT STATUS MIGRAINOSUS, NOT INTRACTABLE, UNSPECIFIED MIGRAINE TYPE: ICD-10-CM

## 2019-12-23 DIAGNOSIS — K21.00 REFLUX ESOPHAGITIS: ICD-10-CM

## 2019-12-24 ENCOUNTER — COMMUNICATION - HEALTHEAST (OUTPATIENT)
Dept: FAMILY MEDICINE | Facility: CLINIC | Age: 34
End: 2019-12-24

## 2019-12-24 DIAGNOSIS — G43.909 MIGRAINE WITHOUT STATUS MIGRAINOSUS, NOT INTRACTABLE, UNSPECIFIED MIGRAINE TYPE: ICD-10-CM

## 2020-01-08 ENCOUNTER — RECORDS - HEALTHEAST (OUTPATIENT)
Dept: ADMINISTRATIVE | Facility: OTHER | Age: 35
End: 2020-01-08

## 2020-01-09 ENCOUNTER — COMMUNICATION - HEALTHEAST (OUTPATIENT)
Dept: SURGERY | Facility: CLINIC | Age: 35
End: 2020-01-09

## 2020-01-23 ENCOUNTER — COMMUNICATION - HEALTHEAST (OUTPATIENT)
Dept: FAMILY MEDICINE | Facility: CLINIC | Age: 35
End: 2020-01-23

## 2020-01-23 DIAGNOSIS — Z82.69 FAMILY HISTORY OF CONNECTIVE TISSUE DISEASE: ICD-10-CM

## 2020-01-24 ENCOUNTER — COMMUNICATION - HEALTHEAST (OUTPATIENT)
Dept: FAMILY MEDICINE | Facility: CLINIC | Age: 35
End: 2020-01-24

## 2020-02-05 ENCOUNTER — TELEPHONE (OUTPATIENT)
Dept: HEMATOLOGY | Facility: CLINIC | Age: 35
End: 2020-02-05

## 2020-02-05 NOTE — TELEPHONE ENCOUNTER
Left VM to call back. New patient appointment, we have referral from [facility] for family hx of ava

## 2020-02-17 ENCOUNTER — OFFICE VISIT (OUTPATIENT)
Dept: HEMATOLOGY | Facility: CLINIC | Age: 35
End: 2020-02-17
Attending: GENETIC COUNSELOR, MS
Payer: COMMERCIAL

## 2020-02-17 ENCOUNTER — OFFICE VISIT (OUTPATIENT)
Dept: HEMATOLOGY | Facility: CLINIC | Age: 35
End: 2020-02-17
Attending: INTERNAL MEDICINE
Payer: COMMERCIAL

## 2020-02-17 VITALS
OXYGEN SATURATION: 97 % | RESPIRATION RATE: 16 BRPM | SYSTOLIC BLOOD PRESSURE: 147 MMHG | WEIGHT: 203.3 LBS | HEART RATE: 117 BPM | DIASTOLIC BLOOD PRESSURE: 90 MMHG | TEMPERATURE: 98 F | HEIGHT: 67 IN | BODY MASS INDEX: 31.91 KG/M2

## 2020-02-17 DIAGNOSIS — Z82.49 FAMILY HISTORY OF ANEURYSM: ICD-10-CM

## 2020-02-17 DIAGNOSIS — Z84.81 FAMILY HISTORY OF CARRIER OF GENETIC DISEASE: Primary | ICD-10-CM

## 2020-02-17 DIAGNOSIS — Z82.49: Primary | ICD-10-CM

## 2020-02-17 PROCEDURE — 99203 OFFICE O/P NEW LOW 30 MIN: CPT | Performed by: INTERNAL MEDICINE

## 2020-02-17 PROCEDURE — G0463 HOSPITAL OUTPT CLINIC VISIT: HCPCS

## 2020-02-17 PROCEDURE — 40000072 ZZH STATISTIC GENETIC COUNSELING, < 16 MIN

## 2020-02-17 RX ORDER — PANTOPRAZOLE SODIUM 40 MG/1
40 TABLET, DELAYED RELEASE ORAL 2 TIMES DAILY
COMMUNITY
Start: 2019-12-23 | End: 2022-09-27

## 2020-02-17 RX ORDER — BUPROPION HYDROCHLORIDE 150 MG/1
150 TABLET ORAL DAILY
COMMUNITY
Start: 2019-12-23 | End: 2020-10-22

## 2020-02-17 RX ORDER — MULTIVITAMIN WITH IRON
1 TABLET ORAL DAILY
COMMUNITY
Start: 2018-04-09 | End: 2020-10-22

## 2020-02-17 ASSESSMENT — MIFFLIN-ST. JEOR: SCORE: 1644.79

## 2020-02-17 ASSESSMENT — PAIN SCALES - GENERAL: PAINLEVEL: NO PAIN (0)

## 2020-02-17 NOTE — PATIENT INSTRUCTIONS
1. We would like to obtain your mom's medical record - mutation. Please have this emailed to [email or web address] or faxed to [phone] nadine Cuadra/Hilary.   2. Based on these findings we would consider doing genetic testing. If this is the mutation we are worried about (vascular type of EDS) then we would want to pursue testing. If it is one of the many other types then we would give you the okay to go ahead with your surgery.   3. Please let us know if you have any questions, comments or concerns.    Main: [phone]   Venecia RN: [phone]    Venecia Gonzalez, MSN, RN, PHN - Nurse Clinician - [facility] - [phone]

## 2020-02-17 NOTE — PROGRESS NOTES
"2/17/2020    Presenting Information: Mary Manuel was seen for an initial evaluation at the [facility] today. I met with her per the request of Dr. Janeen Yao to obtain a family history.     Personal History:   Briefly, Mary is a 34 year old woman with a reported family history of Mane Danlos syndrome, type not specified.  Please see Dr. Yao's note for additional details regarding her personal history. She is having an upcoming elective surgery, and is concerned about how her family history of Mane Danlos syndrome may impact management for that surgery. She does not have a personal history of any bleeding concerns. She has had two pregnancies.     Family History: A three generation pedigree, specific to Mane Danlos syndrome was obtained today and scanned into the EMR. The following information is significant:     She has a 6 year old son and a four year old son who are both are healthy. She reported that with her first son, there was concern for Down syndrome early in the pregnancy, but she had the \"maternity 21\" test that subsequently showed that he likely did not have this diagnosis. She reports that this son does not have Down syndrome, and is healthy.     Mother is 58 and has a history of abdominal aortic aneurysm, which occurred last summer. She does not have any history of smoking. She has been diagnosed with Mane Danlos syndrome, but the type is unknown. It is unclear if it is vascular type or another type of EDS. She had a hysterectomy due to bleeding in the past.     Maternal aunt has also been told she has Mane Danlos syndrome.     Maternal cousin has a diagnosis of Mane Danlos syndrome, and has reportedly had genetic testing. Per Mary, her mom and aunt also had genetic testing. The type of mutation identified in this family is not clear, as these records are not available today.     Maternal grandmother, age 78, has a history of multiple aneurysms. " Mary thinks these were in her heart. She has had other heart issues, and has a history of smoking.     Her father is 65 and healthy. She is unclear about the family history on her father's side of the family. She does know that some of her uncles on that side have had cancer and passed away in their 70s-80s, and that both of her paternal grandparents had cancer and passed in their 80s. She is not sure of the types of cancer these relatives had.     She reports no family history of sudden death, and no family history of anyone who has had major surgical complications.    Maternal ancestry is [place]enegrin, Polish, and [place].  Paternal ancestry is [place]ian, [place]enegrin, Polish, and [place].   There is no reported consanguinity.    Discussion:     We discussed that in order to assess Mary's risk most accurately, we would need a clearer understanding of the type of Mane-Danlos syndrome (EDS) in her family. We discussed that there are many types of EDS, and that each is a little different in terms of symptoms and disease presentation. We discussed that of these, the main concern for her upcoming surgery would be if there is a family history of vascular EDS.     Individuals with vascular EDS have friable skin and blood vessels, making surgery very challenging. Additionally, individuals with vascular EDS are at risk for spontaneous vascular rupture and aneurysms. As there are surgical risks to individuals who have this diagnosis, we would want to determine if Mary has this type of EDS before any surgery.  She has not yet scheduled a surgery, and is waiting to clarify if she has EDS before proceeding. A diagnosis of vascular EDS could also impact her medical management going forward.      Mary verbalized understanding of the above information, and will obtain a copy of the genetic testing that has been done in her family. She was provided with my contact information, including phone number, fax, and email. We discussed that we  cannot guarantee the security of email. She was also set up with [employer], and I have sent her a message so that she can communicate with me that way. Once a copy of her relative's results are obtained, she will send a copy to me, and we will make recommendations after this information is received.     Plan:   1. A three generation pedigree was obtained and scanned into the EMR.  Additional questions or concerns were denied.   2. She will obtain a copy of either her mom or cousin's (or both) test report from their EDS testing, and will send the results for me for review.     Approximate Time Spent in Consultation: 15 minutes.     Hilary Martínez MS, [facility]  Licensed Genetic Counselor  P. [phone]  F. [phone]    CC: No Letter

## 2020-02-17 NOTE — PROGRESS NOTES
[facility] Consult    Reasons for Consult: -Family history of Mane- Danlos syndrome, possible vascular type    History of Present Illness: Ms. Manuel is a 34-year-old woman with a family history of Mane-Danlos, apparently vascular type.  She is possibly going to have surgery for hiatal hernia, so she wants to know if she has the same thing, because of risk of bleeding.  Apparently a maternal cousin with multiple medical problems was originally tested and found to have an ED gene abnormality.  From there her maternal aunt was tested and then her mother.  Her mother at age 58 had an abdominal aneurysm (exact location I do not know).  Apparently she had excessive bleeding after the surgical procedure.  Her mother also had significant problem with heavy menses.  She does not have a copy of the genetic testing from any of her relatives.    The patient does not have any significant bleeding symptoms-no epistaxis, gum bleeding, melena, hematochezia, heavy menses, hematuria, easy bruising.  She has had 2 pregnancies, 1 with premature delivery at 34 weeks because of placental insufficiency.  [facility], and no specific abnormality was found.  With her second child she was treated with Lovenox throughout the pregnancy.  She did not have any excessive bleeding with either delivery, she did not need a .  She has had wisdom tooth extraction with no excessive bleeding.  No other surgical procedures.    Past Medical History:  - Severe GERD with hiatal hernia  - G2, P2-vaginal deliveries, with the first 1  due to placental insufficiency  - Migraine headaches  -Exercise-induced asthma  - History of anxiety, obsessive/compulsive diosrder    Medications:  - Bupropion  mg daily  -Vitamin D 3000 units daily  -Magnesium 250 mg daily  -Pantoprazole 40 mg twice daily  - Sertraline 50 mg daily  - Occasional ibuprofen or  "naproxen.    Family History: mother- abdominal aneurysm, Mane Danlos syndrome (gene mutation tested, but I do not have those results), maternal aunt and maternal cousin also with Mane-Danlos syndrome, father-healthy, siblings-one brother, one sister-healthy    Social History: smoking-never, alcohol- once or twice a month.  No illicit drugs, CBD oil.  , 2 young children at home.  Works full-time.    Review of systems:  As in HPI.  Severe heartburn.  She has noticed a few scattered lesions on her skin first on her feet, no on her hands that are ring with some central pallor.  She says that she has been seen by dermatologist and had some steroid injected into it, but it was unknown what this is.  The rest of the > 10 point review of systems was negative.    PHYSICAL EXAMINATION:  BP (!) 147/90 (BP Location: Right arm, Patient Position: Sitting, Cuff Size: Adult Regular)   Pulse 117   Temp 98  F (36.7  C) (Oral)   Resp 16   Ht 1.702 m (5' 7\")   Wt 92.2 kg (203 lb 4.8 oz)   SpO2 97%   BMI 31.84 kg/m      General appearance:  Patient is 33 yo woman in no acute distress.     HEENT:  No pallor, icterus, or mucositis. .   Lungs:  Clear to auscultation bilaterally.   Heart:  Regular rate and rhythm; no S3 S4 or murmer.     Abdomen:  Obese. Positive bowel sounds, soft and nontender, nondistended.  No hepatomegaly. No splenomegaly appreciated.    Extremities: No hyperflexibility.  No joint swelling or tenderness.  No ankle edema.     Skin: Dorsum of right hand, approximately 1 cm round erythematous ring with central pallor.  No petechiae or ecchymoses.    Labs:  n/a  Assessment and Recommendation: -This is a 34-year-old woman with a family history of Mane Danlos.  By her description it sounds like the vascular type.  This would be important to know for her if she is going to have esophageal surgery.  So far, she has not had much hemostatic challenges, but she has not had excessive bleeding with vaginal " deliveries or the wisdom tooth extraction, no excessive bruising, and she has never had heavy menstrual periods, so this suggests that she does not vascular ED.  I discussed with her that would be very useful to know the exact gene mutation- vascular type EDS is associated with mutations in the COL3A1 gene.  With that information, we would probably arrange to have her blood sent to the same laboratory for testing.  If indeed her mother has a vascular EDS mutation, then I think there is a strong reason to test her (the patient) for this before going forward with a major surgical procedure.  She will talk with her family members and hopefully one of them will be willing to share this information with her.  When she has that, we can arrange for testing.  I want to there is a small chance that her insurance will not pay for the testing, although I think we have a good argument for doing the testing, so we would appeal if that is the case.  The need for follow-up can be based on whether she has a vascular EDS mutation.    Janeen Smart MD  Hematology    Addendum: Testing in COL3A1 did not reveal any mutations. That, along with lack of significant bleeding with vaginal delivery and wisdom teeth make it unlikley that she has vascular EDS. No special recommendations for surgical procedures. No need for follow up in hematology clinic.   Janeen Smart MD  Hematology  Labs:  Patient Name: IZABELA MORRIS   MR#: [id number]   Specimen #: [id number]   Collected: 10/1/2020 13:35   Received: 10/1/2020 13:39   Reported: 10/14/2020 15:37   Ordering Phy(s): JANEEN SMART   Additional Phy(s): DEIRDRE RAMOS     For improved result formatting, select 'View Enhanced Report Format' under    Linked Documents section.   _________________________________________     TEST(S) REQUESTED:   Next Generation Sequencing     SPECIMEN DESCRIPTION:   BLOOD COLLECTED 9/30/2020     TEST REQUESTED:   Mane-Danlos Syndrome, Vascular Type.    Next generation sequencing and copy number variation analysis of: COL3A1.     RESULTS:     NEGATIVE   Pathogenic/Likely Pathogenic Variant(s):     None Detected   Variant(s) of Uncertain Significance:          None Detected     INTERPRETATION:   No clearly pathogenic sequence variants or clinically significant copy   number variants were detected in the   gene analyzed.     Genetic counseling regarding these results is recommended.     COVERAGE:   This analysis is limited to the coding exons and immediately adjoining   intronic sequences of the analyzed   genes. Coverage is only guaranteed for biologically relevant transcripts,   as defined in the LRG database.   Coverage minimums are not guaranteed for intronic positions. Therefore,   intronic variants cannot be confirmed   or excluded with the same degree of confidence as coding exonic variants.   With the exception of a limited set   of known pathogenic variants, sequences residing more than 25 base pairs   from a coding exon are not routinely   analyzed. A list of these supplemental positions is available upon   request. The proportion of coding bases   covered at 15x and 20x coverage are reported below. Sensitivity is reduced    in regions with less than 20x   coverage, and mutations cannot be confidently excluded in regions with   <15x coverage. A list of specific   regions not meeting these minimum thresholds is available upon request.   For panels with less than 25 genes,   Leland sequencing is used to supplement coverage in regions with <15x   coverage.   Percentage of bases covered at 15x: 100   Percentage of bases covered at 20x: 100     METHODOLOGY [Lewis et al., 2015][Albert et al., 2014]:   Genomic DNA is extracted from the sample, and sequencing libraries are   prepared according to standard Agilent   protocols using a custom-designed Agilent Sureselect XT kit for enrichment    of targeted genes. The enriched DNA   libraries are sequenced on an  "Illumina 6Wavesq or Carlipa Systemsq instrument. Raw   sequencing reads are mapped to the   reference genome using BWA. Raw alignment files are realigned in the   neighborhood of indels and recalibrated   for base quality accuracy using the Genome Analysis Tool Kit (GATK)   version 3.8. Point mutation and indel   calls in exons and adjoining intronic regions are made using a combination    of the GATK haplotype caller and   Samtools mpileup. Variants are interpreted according to guidance issued by    the American College of Medical   Genetics [Castillo et al., 2015]. For exons 11-15 of PMS2, reads from both    PMS2 and PMS2CL are aligned to the   PMS2 sequence as described in Borjas et al. [2018]. Any pathogenic or   likely pathogenic variants in exons 11-15   of PMS2 are subsequently confirmed by long-range PCR. The long-range PMS2   PCR product is processed and   sequenced on an Illumina MiSeq instrument and reads are aligned to PMS2.   Genotyping and indel calling for the   PMS2 long-range PCR product are done using Freebayes and Scanindel [Smith   et al., 2015].     Pathogenic and predicted pathogenic variants that meet ALL of the   following criteria are reported without   validation by [place] sequencin- single nucleotide substitution, 2-   receives a \"PASS\" from the SNP or indel   filter, 3- has a VAF in the accepted range (heterozygous = 0.3-0.6,   homozygous/hemizygous >0.9), 4- has a   minimum of 20x coverage, and 5-is present in the GATHiperScan VCF file.   Pathogenic variants that fail to meet any of   these criteria are verified by Kezia sequencing prior to reporting   [Roosevelt et al., 2015].     For copy number variation (CNV) analysis, raw sequencing reads are mapped   to the reference human genome (HG19)   using both BWA and Bowtie algorithms. CNV ratios are computed by comparing    the average coverage in the sample   across 60 base pair windows. Average coverage is compared to control loci   both within the " sample and within a   gender-matched control sample from the same run. The BWA/Bowtie coverage   ratio is calculated for each window   in order to identify regions where the presence of homologous sequences   precludes accurate CNV calls.   Heterozygous deletion calls are made when 3 consecutive windows have a CNV    ratio of 0.3-0.7;   homozygous/hemizygous deletion calls are made when 3 consecutive windows   have a CNV ratio less than 0.3; and   duplication calls are made when 5 consecutive windows have a CNV ratio   greater than 1.3. Calls are only made   in areas where the BWA/Bowtie ratio is 0.75-1.1 and the average coverage   is 20x. All CNV calls are confirmed   with a supplementary qPCR assay [Albert et al., 2016].     The following types of variants are not included in the clinical report,   but information about these variants   is available upon request:   *Missense variants that are present at >1% MAF in population databases AND    are not reported as   pathogenic/likely pathogenic in ClinVar.   *Missense variants with a MAF <1% that are classified as benign or likely   benign according to published ACMG   criteria.   *Synonymous variants that do not occur at intron/exon boundaries, are not   reported as pathogenic mutations in   relevant clinical databases, and are present in 15 or more individuals in   ExAC.   *Intronic variants that reside more than 5 bases from the exon/intron   boundary AND are not reported as   pathogenic/likely pathogenic in ClinVar. Known pathogenic variants   residing 5-25bp from an intron/exon   boundary will be reported.   *Untranslated region (UTR) variants not previously categorized as   pathogenic or likely pathogenic in relevant   clinical databases.   *Some variants may be excluded based on review of sequencing quality data.    It is possible that some low   quality variants are, in fact, real.     LIMITATIONS:   This analysis has not been validated for detection of  insertion/deletion   mutations larger than 18bp in length.   The size of polymorphic repetitive sequences, such as CAG repeats,   intronic dinucleotide repeats, or intronic   polyT/polyA sequences, cannot be determined by this analysis.     The CNV algorithm is not validated to detect deletions encompassing less   than 180 base pairs of coding   sequence or duplications encompassing less than 300 base pairs of coding   sequence. Coding exons comprised of   less than 60 bases are not assessed by these methods. The CNV algorithm   does not define precise breakpoints   for duplications or deletions.     Due to issues with GC content and/or repetitive sequences, genotyping   cannot be performed in a limited number   of regions, even when coverage exceeds 20x. Each of the following genes   contains an exon that cannot be   adequately analyzed due to these issues: CCDC40, CES1, CISD2, DSPP, ESPN,   FMN2, GCSH1, GNAS, GP1BA, HYDIN,   KMT2C, KRT8, TODD, MUC5B, NEFH, PPA2, PSPH, RBMX, RP1L1, SHANK3, ,   TRIOBP, and UCM099. Additional details   are available upon request.     REFERENCES:   Roosevelt GRADY, Reynaldo SMITH, Ktety SHAW, Anmol ARAUJO, Albert PISANO, et al. 2015. Criteria    for Clinical Reporting of Variants   from a Broad Target Capture NGS Assay without Kezia Verification. JSM   biomarkers 2(1):1005.     Albert PISANO, Ketty SHAW, Anmol Wilson, Emely SMITH, Michael RIVERA,   Thyagarajan B. 2016. CNV-RF Is a Random   [place]-Based Copy Number Variation Detection Method Using Next-Generation   Sequencing. J Mol Diagn.   18(6):872-881. PMID: 82036227.     Vicki Smallwood Spears MD, Nicole Dee, Shanell A, Lewis S,   Emely SMITH, Reynaldo SMITH, Michael KA,   Thyagarajan B. 2014. Implementation of Cloud based next generation   sequencing data analysis in a clinical   laboratory. BMC Res Notes. 7:314. PMID: 33142981.     Jonathan COOK, Mary Jo N, Marcus S, Yvette D, Guanaco S, Bibi J, Estella VICK,   Robe M, Valentino E, Omari E,  Matheus PATEL, Messi HL, [facility] Laboratory  Committee. 2015. Standards   and guidelines for the interpretation   of sequence variants: a joint consensus recommendation of the American   College of Medical Genetics and   Genomics and the Association for Molecular Pathology. Shante. Med.   17(5):405-24. PMID: 25305838.     Lewis S, Shanell A, Bertha K, Justo T, Reynaldo M, Emely M, Albert G,   Te J, Vicki J, Hector Y, Antonio Rodrigez MD, Nicole PATEL, Michael RIVERA, Benson BURTON. 2015. Clinical   validation of targeted next-generation   sequencing for inherited disorders. Arch. Pathol. Lab. Med. 139(2):204-10.    PMID: 11860622.     If a patient is the recipient of an allogeneic bone marrow transplant,   this test must be done on a   pre-transplant sample or buccal swab. A previous allogeneic bone marrow   transplant will interfere with test   results. Call the Molecular Diagnostics Lab ([phone]) for   instructions on sample collection for these   patients.     This test was developed and its performance characteristics determined by   the [facility], Molecular Diagnostics Laboratory and the [facility]   [facility] (Cancer &   Cardiovascular Research Building Room [address], [address] [place] [address]). Genomic DNA extraction,   interpretation of sequencing data, and, when necessary, [place] sequencing   is performed at [facility], Molecular Diagnostics Laboratory. Wet bench   processes including library creation,   sequence capture using an Illumina Phunware analyzer and bioinformatics is   performed at the [facility] [place]. It has not been cleared or approved by the FDA.    The laboratory is regulated under   CLIA as qualified to perform high-complexity testing. This test is used   for clinical purposes. It should not   be regarded as investigational or for research.     A  resident/fellow in an accredited training program was involved in the   selection of testing, review of   laboratory data, and/or interpretation of this case. I, as the senior   physician, attest that I: (i) confirmed   appropriate testing, (ii) examined the relevant raw data for the   specimen(s), and (iii) rendered or confirmed   the interpretation(s).     Electronically Signed Out By:   Quentin Albert M.D., [facility]     CPT Codes:    -FFFAVI, [id number]     TESTING LAB LOCATION:   [address] [address]

## 2020-02-19 ENCOUNTER — COMMUNICATION - HEALTHEAST (OUTPATIENT)
Dept: FAMILY MEDICINE | Facility: CLINIC | Age: 35
End: 2020-02-19

## 2020-02-19 NOTE — PROGRESS NOTES
Mary Manuel is here for a consultation visit with Dr. Yao. Mary is being seen for family history of EDS and upcoming surgery.    Vitals were completed. Medications and allergies were reviewed by CMA.     After patient was seen by Dr. Yao, we reviewed the plan that she would send us the paperwork regarding her family's genetic testing. If the family variant is the vascular type of EDS we would recommend getting testing and holding off on the elective procedure. If it is not she can proceed with surgery.     The AVS instructions were then updated and given to the patient. RN then thanked Mary for coming and encouraged her to call should she have any questions or concerns.    Venecia Gonzalez RN - Nurse Clinician - [facility] - [phone]

## 2020-02-21 ENCOUNTER — TELEPHONE (OUTPATIENT)
Dept: HEMATOLOGY | Facility: CLINIC | Age: 35
End: 2020-02-21

## 2020-02-21 NOTE — TELEPHONE ENCOUNTER
Mary Manuel  [id number]  1985  Family History of EDS-patient seen by Dr. Yao on 2/17/20    [facility]  She said Mary's surgeon contacted their office requesting they do genetic testing.  The patient had requested that the surgeon do the testing because the hematologist wouldn't order the test.  After reviewing Dr. Yao's note, the patient was requested to get information about the genetic testing done on her mom.  There was no documentation that the patient had called with any information.  The PCP office mentioned that surgery was now on hold until clarification could be made.  Dr. Rocha will be back on Tuesday, but is requesting a call back to her cell number [phone].    I left Mary a message today requesting a more detailed update, asking whether testing on the mom was not done or whether she just didn't have access to it.  I asked that she call me back, so that I can give Dr. Yao this information on Monday.     Joanne Davis, MSN, RN, PHN -Nurse Clinician, [facility] [phone]

## 2020-02-25 ENCOUNTER — TELEPHONE (OUTPATIENT)
Dept: HEMATOLOGY | Facility: CLINIC | Age: 35
End: 2020-02-25

## 2020-02-25 NOTE — TELEPHONE ENCOUNTER
Mary Manuel  [id number]  1985  Family History of EDS-patient seen by Dr. Yao on 2/17/20    Received call from Louann @ [employer] [place] (Dr. Rocha's office).  Informed her that I had spoken with Michaela from their office on Friday.  I had informed both Dr. Yao and Venecia the nurse about that conversation on Monday.  I told Louann that I had left the patient a message on Firday requesting information from her about her family's genetic testing or lack there of this information.  We have not heard from her yet.      Louann stated she would pass this information along to Dr. Rocha.  Joanne Davis, MSN, RN, PHN -Nurse Clinician, [facility] [phone]

## 2020-02-28 ENCOUNTER — MYC MEDICAL ADVICE (OUTPATIENT)
Dept: HEMATOLOGY | Facility: CLINIC | Age: 35
End: 2020-02-28

## 2020-03-02 DIAGNOSIS — Z84.89 FAMILY HISTORY OF GENETIC DISORDER: Primary | ICD-10-CM

## 2020-03-02 DIAGNOSIS — Z82.49 FAMILY HISTORY OF ANEURYSM: ICD-10-CM

## 2020-03-11 ENCOUNTER — HEALTH MAINTENANCE LETTER (OUTPATIENT)
Age: 35
End: 2020-03-11

## 2020-03-30 ENCOUNTER — COMMUNICATION - HEALTHEAST (OUTPATIENT)
Dept: FAMILY MEDICINE | Facility: CLINIC | Age: 35
End: 2020-03-30

## 2020-04-02 ENCOUNTER — TELEPHONE (OUTPATIENT)
Dept: CONSULT | Facility: CLINIC | Age: 35
End: 2020-04-02

## 2020-04-02 NOTE — TELEPHONE ENCOUNTER
I called 4/2/20 to update Mary on insurance coverage for her genetic testing (PA approval was received). However, I was unable to reach Mary.  I left a non-detailed voicemail with my name and phone number.    JAZMIN Aguilar  Genomics Billing    [facility]   Molecular Diagnostics Laboratory  [address] [place] [address]  [phone]

## 2020-04-10 NOTE — TELEPHONE ENCOUNTER
I called 4/10/2020 to update Mary on insurance coverage for her genetic testing (PA approval was received). However, I was unable to reach Mary.  I left a non-detailed voicemail with my name and phone number.     JAZMIN Aguilar  Genomics Billing    [facility]   Molecular Diagnostics Laboratory  [address] [place] [address]  [phone]

## 2020-04-22 NOTE — TELEPHONE ENCOUNTER
I called 4/22/2020 to update Mary on insurance coverage for her genetic testing (PA approval was received). However, I was unable to reach Mary.  I left a non-detailed voicemail with my name and phone number.    JAZMIN Aguilar  Genomics Billing    [facility]   Molecular Diagnostics Laboratory  [address] [place] [address]  [phone]

## 2020-07-07 ENCOUNTER — COMMUNICATION - HEALTHEAST (OUTPATIENT)
Dept: FAMILY MEDICINE | Facility: CLINIC | Age: 35
End: 2020-07-07

## 2020-07-07 NOTE — TELEPHONE ENCOUNTER
Mary left a voice mail on 7/1 stating she'd like to proceed with testing. I returned her call to try to coordinate testing, but I was unable to reach Mary.  I left a non-detailed voicemail with my name and phone number.      JAZMIN Aguilar   Genomics Billing   [facility]  Molecular Diagnostics Laboratory  [address] [place] [address]  [email or web address]   Office: [phone]  Fax: [phone]

## 2020-07-16 NOTE — TELEPHONE ENCOUNTER
I called on 7/16 to follow up with Mary regarding coordinating testing, but I was unable to reach Mary.  I left a non-detailed voicemail with my name and phone number.        JAZMIN Aguilar   Genomics Billing   [facility]  Molecular Diagnostics Laboratory  [address] [place] [address]  [email or web address]   Office: [phone]  Fax: [phone]

## 2020-07-20 ENCOUNTER — COMMUNICATION - HEALTHEAST (OUTPATIENT)
Dept: FAMILY MEDICINE | Facility: CLINIC | Age: 35
End: 2020-07-20

## 2020-07-22 ENCOUNTER — COMMUNICATION - HEALTHEAST (OUTPATIENT)
Dept: FAMILY MEDICINE | Facility: CLINIC | Age: 35
End: 2020-07-22

## 2020-07-22 DIAGNOSIS — F42.2 MIXED OBSESSIONAL THOUGHTS AND ACTS: ICD-10-CM

## 2020-07-24 ENCOUNTER — COMMUNICATION - HEALTHEAST (OUTPATIENT)
Dept: FAMILY MEDICINE | Facility: CLINIC | Age: 35
End: 2020-07-24

## 2020-07-24 ENCOUNTER — OFFICE VISIT - HEALTHEAST (OUTPATIENT)
Dept: FAMILY MEDICINE | Facility: CLINIC | Age: 35
End: 2020-07-24

## 2020-07-24 DIAGNOSIS — Z13.228 SCREENING FOR METABOLIC DISORDER: ICD-10-CM

## 2020-07-24 DIAGNOSIS — Z13.21 SCREENING FOR ENDOCRINE, NUTRITIONAL, METABOLIC AND IMMUNITY DISORDER: ICD-10-CM

## 2020-07-24 DIAGNOSIS — Z13.0 SCREENING FOR ENDOCRINE, NUTRITIONAL, METABOLIC AND IMMUNITY DISORDER: ICD-10-CM

## 2020-07-24 DIAGNOSIS — K21.00 REFLUX ESOPHAGITIS: ICD-10-CM

## 2020-07-24 DIAGNOSIS — Z13.29 SCREENING FOR ENDOCRINE, NUTRITIONAL, METABOLIC AND IMMUNITY DISORDER: ICD-10-CM

## 2020-07-24 DIAGNOSIS — Z13.29 SCREENING FOR THYROID DISORDER: ICD-10-CM

## 2020-07-24 DIAGNOSIS — E66.812 CLASS 2 OBESITY DUE TO EXCESS CALORIES WITHOUT SERIOUS COMORBIDITY WITH BODY MASS INDEX (BMI) OF 35.0 TO 35.9 IN ADULT: ICD-10-CM

## 2020-07-24 DIAGNOSIS — Z13.228 SCREENING FOR ENDOCRINE, NUTRITIONAL, METABOLIC AND IMMUNITY DISORDER: ICD-10-CM

## 2020-07-24 DIAGNOSIS — E55.9 VITAMIN D DEFICIENCY: ICD-10-CM

## 2020-07-24 DIAGNOSIS — E66.09 CLASS 2 OBESITY DUE TO EXCESS CALORIES WITHOUT SERIOUS COMORBIDITY WITH BODY MASS INDEX (BMI) OF 35.0 TO 35.9 IN ADULT: ICD-10-CM

## 2020-07-24 DIAGNOSIS — Z13.0 SCREENING, ANEMIA, DEFICIENCY, IRON: ICD-10-CM

## 2020-07-24 DIAGNOSIS — Z13.220 SCREENING, LIPID: ICD-10-CM

## 2020-07-24 DIAGNOSIS — Z13.1 SCREENING FOR DIABETES MELLITUS: ICD-10-CM

## 2020-07-24 ASSESSMENT — MIFFLIN-ST. JEOR: SCORE: 1584.91

## 2020-07-24 NOTE — ASSESSMENT & PLAN NOTE
Planning nissen fundiplication, but on hold due to covid. Wt loss recommended. See bmi in problem list.

## 2020-07-28 NOTE — TELEPHONE ENCOUNTER
I called on 7/28/2020 to follow up with Mary regarding coordinating testing, but I was unable to reach Mary.  I left a non-detailed voicemail with my name and phone number.        JAZMIN Aguilar   Genomics Billing   [facility]  Molecular Diagnostics Laboratory  [address] [place] [address]  [email or web address]   Office: [phone]  Fax: [phone]

## 2020-08-03 ENCOUNTER — COMMUNICATION - HEALTHEAST (OUTPATIENT)
Dept: FAMILY MEDICINE | Facility: CLINIC | Age: 35
End: 2020-08-03

## 2020-08-26 ENCOUNTER — COMMUNICATION - HEALTHEAST (OUTPATIENT)
Dept: FAMILY MEDICINE | Facility: CLINIC | Age: 35
End: 2020-08-26

## 2020-08-26 DIAGNOSIS — E66.09 CLASS 2 OBESITY DUE TO EXCESS CALORIES WITHOUT SERIOUS COMORBIDITY WITH BODY MASS INDEX (BMI) OF 35.0 TO 35.9 IN ADULT: ICD-10-CM

## 2020-08-26 DIAGNOSIS — E66.812 CLASS 2 OBESITY DUE TO EXCESS CALORIES WITHOUT SERIOUS COMORBIDITY WITH BODY MASS INDEX (BMI) OF 35.0 TO 35.9 IN ADULT: ICD-10-CM

## 2020-08-26 DIAGNOSIS — G43.909 MIGRAINE WITHOUT STATUS MIGRAINOSUS, NOT INTRACTABLE, UNSPECIFIED MIGRAINE TYPE: ICD-10-CM

## 2020-08-28 ENCOUNTER — COMMUNICATION - HEALTHEAST (OUTPATIENT)
Dept: FAMILY MEDICINE | Facility: CLINIC | Age: 35
End: 2020-08-28

## 2020-08-28 DIAGNOSIS — E66.09 CLASS 2 OBESITY DUE TO EXCESS CALORIES WITHOUT SERIOUS COMORBIDITY WITH BODY MASS INDEX (BMI) OF 35.0 TO 35.9 IN ADULT: ICD-10-CM

## 2020-08-28 DIAGNOSIS — E66.812 CLASS 2 OBESITY DUE TO EXCESS CALORIES WITHOUT SERIOUS COMORBIDITY WITH BODY MASS INDEX (BMI) OF 35.0 TO 35.9 IN ADULT: ICD-10-CM

## 2020-08-28 DIAGNOSIS — G43.909 MIGRAINE WITHOUT STATUS MIGRAINOSUS, NOT INTRACTABLE, UNSPECIFIED MIGRAINE TYPE: ICD-10-CM

## 2020-09-01 ENCOUNTER — OFFICE VISIT - HEALTHEAST (OUTPATIENT)
Dept: FAMILY MEDICINE | Facility: CLINIC | Age: 35
End: 2020-09-01

## 2020-09-01 DIAGNOSIS — E66.09 CLASS 2 OBESITY DUE TO EXCESS CALORIES WITHOUT SERIOUS COMORBIDITY WITH BODY MASS INDEX (BMI) OF 35.0 TO 35.9 IN ADULT: ICD-10-CM

## 2020-09-01 DIAGNOSIS — E66.812 CLASS 2 OBESITY DUE TO EXCESS CALORIES WITHOUT SERIOUS COMORBIDITY WITH BODY MASS INDEX (BMI) OF 35.0 TO 35.9 IN ADULT: ICD-10-CM

## 2020-09-01 ASSESSMENT — MIFFLIN-ST. JEOR
SCORE: 1593.07
SCORE: 1593.07

## 2020-09-01 NOTE — ASSESSMENT & PLAN NOTE
[place] - wants to restart active weight loss/  WEIGHT MAINTENANCE PHASE (BMI DOWN FROM 34 TO 33)  Dx: BMI 34.63, in the setting of multiple weight related comorbid conditions including: Chronic reflux esophagitis, severe binge eating disorder, and vitamin D deficiency     Restarting weight loss  Dc wellbutrin - wants to dc and try phentermine again.  Dc qsymia, didn't work, thinks caused gerd wants phentermine again. Will try lomira 8mg po tid     Annual exam and Labs at that time for baseline as she restarts weight loss.   Info on smart scale sent.      Follow up in 1 month with me  Follow up asap with dietician  Follow up with 3 pack health coaching asap (wants to try this before considering 24 week plan).      Initial Weight: 208.1 lbs bmi 34.63, 7/17/2018 (restart)  Weight: 208 lb 1.6 oz 7/17/2018   Weight: 206 lb (93.4 kg) bmi 34.28 8/15/2018   Weight: 203 lb (92.1 kg) bmi 33.78 9/12/2018   Weight: 200 lb 14.4 oz (91.1 kg) bmi 33.43, 11/29/2018   Weight: 201 lb (91.2 kg) bmi 33.45, 1/16/2019   Weight: 199 lb (90.3 kg) bmi 33.12, 2/19/2019   Weight: 198 lb 3.2 oz (89.9 kg), BMI, 32, 7/24/2020   Weight: 200 lb (90.7 kg), BMI 33.  Weight loss from initial: 8.1  % Weight loss: 3.89 %     LEXAPRO 20 mg orally per day -anxiety and obsessive-compulsive disorder  7/2017-7/2018-weight gain of 30 pounds  Dc 7/17/2018       ZOLOFT 50 mg orally per day-anxiety and OCD  7/17/2018 - 2/19/2019    - doing well feels this is working. - will continue     WELLBUTRIN 150 XL DAILY   8/15/2018 - 7/24/2020  -  DC WELLBUTRIN, WANTS TO RESTART phentermine.       PHENTERMINE  For weight loss  4/19/2016-9/2016-initially lost 15 pounds but then even though she was still takingthe phentermine she gained 7 pounds.  9/2016-12/2016 she did not take phentermine and gained 10 pounds during this time  1/2017 -4/2017 -she did not take phentermine and lost 15 pounds  4/2017 - 11/2017 -weight was stable and went up and down 5 pounds despite the  use of phentermine during this time  11/2017 -7/2018-gained 30 pounds probably due to use of Lexapro during this time  7/2018-we will consider use of phentermine again in the future but at this time I would like to start Zoloft and discontinue Lexapro to see how she does once she stabilized on the Zoloft we can consider adding or changing medications.  9/1/2020 - RESTART 8MG PO TID     QSYMIA  7/24/2020 - 9/1/2020 caused GERD. ? topamax component due to phentermine being tolerated in the past     saxenda   1/16/2019 -she did look into this and thought about this at length.  She decided not to do it because of her thyroid abnormality and she is worried about potential for thyroid medullary carcinoma with this drug.        Weaknesses:   -She has not been exercising lately due to gerd 9/1/2020   -Stress  -Genetics  - Depression  -works full time - desk job  - craves sweets, candy, chocolate, chips, crackers     Strengths:   -She is a been able to maintain the weight loss that she achieved back in 2018, now July 2020 she wants to restart weight loss and lose more  - she does the food shopping  - family supportive of her health goals  - likes veggies  -drinks water

## 2020-09-09 ENCOUNTER — DOCUMENTATION ONLY (OUTPATIENT)
Dept: HEMATOLOGY | Facility: CLINIC | Age: 35
End: 2020-09-09

## 2020-09-09 NOTE — PROGRESS NOTES
9/9/2020    After multiple attempts to reach Mary by our laboratory , nurse, and myself over the last few months, we have not been able to reach Mary. I have sent her a letter with our contact information should she wish to proceed with genetic testing.    Hilary Martínez MS, [facility]  Licensed, Certified Genetic Counselor  P. [phone]  F. [phone]

## 2020-09-12 ENCOUNTER — COMMUNICATION - HEALTHEAST (OUTPATIENT)
Dept: FAMILY MEDICINE | Facility: CLINIC | Age: 35
End: 2020-09-12

## 2020-09-14 ENCOUNTER — OFFICE VISIT - HEALTHEAST (OUTPATIENT)
Dept: SURGERY | Facility: CLINIC | Age: 35
End: 2020-09-14

## 2020-09-14 DIAGNOSIS — Z71.3 NUTRITIONAL COUNSELING: ICD-10-CM

## 2020-09-14 DIAGNOSIS — E66.9 OBESITY: ICD-10-CM

## 2020-09-14 DIAGNOSIS — K21.9 GASTROESOPHAGEAL REFLUX DISEASE WITHOUT ESOPHAGITIS: ICD-10-CM

## 2020-09-14 DIAGNOSIS — E66.09 CLASS 1 OBESITY DUE TO EXCESS CALORIES WITH SERIOUS COMORBIDITY AND BODY MASS INDEX (BMI) OF 33.0 TO 33.9 IN ADULT: ICD-10-CM

## 2020-09-14 DIAGNOSIS — E66.811 CLASS 1 OBESITY DUE TO EXCESS CALORIES WITH SERIOUS COMORBIDITY AND BODY MASS INDEX (BMI) OF 33.0 TO 33.9 IN ADULT: ICD-10-CM

## 2020-09-19 ENCOUNTER — COMMUNICATION - HEALTHEAST (OUTPATIENT)
Dept: FAMILY MEDICINE | Facility: CLINIC | Age: 35
End: 2020-09-19

## 2020-09-25 ENCOUNTER — OFFICE VISIT - HEALTHEAST (OUTPATIENT)
Dept: FAMILY MEDICINE | Facility: CLINIC | Age: 35
End: 2020-09-25

## 2020-09-25 ENCOUNTER — MEDICAL CORRESPONDENCE (OUTPATIENT)
Dept: HEALTH INFORMATION MANAGEMENT | Facility: CLINIC | Age: 35
End: 2020-09-25

## 2020-09-25 ENCOUNTER — TRANSFERRED RECORDS (OUTPATIENT)
Dept: HEALTH INFORMATION MANAGEMENT | Facility: CLINIC | Age: 35
End: 2020-09-25

## 2020-09-25 DIAGNOSIS — E04.9 GOITER, NODULAR: ICD-10-CM

## 2020-09-25 DIAGNOSIS — Z13.0 SCREENING, ANEMIA, DEFICIENCY, IRON: ICD-10-CM

## 2020-09-25 DIAGNOSIS — E66.09 CLASS 2 OBESITY DUE TO EXCESS CALORIES WITHOUT SERIOUS COMORBIDITY WITH BODY MASS INDEX (BMI) OF 35.0 TO 35.9 IN ADULT: ICD-10-CM

## 2020-09-25 DIAGNOSIS — K21.9 GASTROESOPHAGEAL REFLUX DISEASE WITHOUT ESOPHAGITIS: ICD-10-CM

## 2020-09-25 DIAGNOSIS — E55.9 VITAMIN D DEFICIENCY: ICD-10-CM

## 2020-09-25 DIAGNOSIS — F45.8 BRUXISM: ICD-10-CM

## 2020-09-25 DIAGNOSIS — Z00.00 ENCOUNTER FOR PREVENTIVE CARE: ICD-10-CM

## 2020-09-25 DIAGNOSIS — K21.00 REFLUX ESOPHAGITIS: ICD-10-CM

## 2020-09-25 DIAGNOSIS — F42.2 MIXED OBSESSIONAL THOUGHTS AND ACTS: ICD-10-CM

## 2020-09-25 DIAGNOSIS — Z13.228 SCREENING FOR METABOLIC DISORDER: ICD-10-CM

## 2020-09-25 DIAGNOSIS — Z13.220 SCREENING, LIPID: ICD-10-CM

## 2020-09-25 DIAGNOSIS — E66.812 CLASS 2 OBESITY DUE TO EXCESS CALORIES WITHOUT SERIOUS COMORBIDITY WITH BODY MASS INDEX (BMI) OF 35.0 TO 35.9 IN ADULT: ICD-10-CM

## 2020-09-25 DIAGNOSIS — Z11.4 SCREENING FOR HIV WITHOUT PRESENCE OF RISK FACTORS: ICD-10-CM

## 2020-09-25 DIAGNOSIS — Z12.4 SCREENING FOR MALIGNANT NEOPLASM OF CERVIX: ICD-10-CM

## 2020-09-25 LAB
ALBUMIN SERPL-MCNC: 4.5 G/DL (ref 3.5–5)
ALP SERPL-CCNC: 56 U/L (ref 45–120)
ALT SERPL W P-5'-P-CCNC: 12 U/L (ref 0–45)
ALT SERPL-CCNC: 12 U/L (ref 0–45)
ANION GAP SERPL CALCULATED.3IONS-SCNC: 10 MMOL/L (ref 5–18)
AST SERPL W P-5'-P-CCNC: 15 U/L (ref 0–40)
AST SERPL-CCNC: 15 U/L (ref 0–40)
BASOPHILS # BLD AUTO: 0 THOU/UL (ref 0–0.2)
BASOPHILS NFR BLD AUTO: 1 % (ref 0–2)
BILIRUB SERPL-MCNC: 0.7 MG/DL (ref 0–1)
BUN SERPL-MCNC: 10 MG/DL (ref 8–22)
CALCIUM SERPL-MCNC: 9.9 MG/DL (ref 8.5–10.5)
CHLORIDE BLD-SCNC: 104 MMOL/L (ref 98–107)
CO2 SERPL-SCNC: 25 MMOL/L (ref 22–31)
CREAT SERPL-MCNC: 0.83 MG/DL (ref 0.6–1.1)
CREAT SERPL-MCNC: 0.83 MG/DL (ref 0.6–1.1)
EOSINOPHIL # BLD AUTO: 0.1 THOU/UL (ref 0–0.4)
EOSINOPHIL NFR BLD AUTO: 1 % (ref 0–6)
ERYTHROCYTE [DISTWIDTH] IN BLOOD BY AUTOMATED COUNT: 11.7 % (ref 11–14.5)
GFR SERPL CREATININE-BSD FRML MDRD: >60 ML/MIN/1.73M2
GFR SERPL CREATININE-BSD FRML MDRD: >60 ML/MIN/1.73M2
GLUCOSE BLD-MCNC: 96 MG/DL (ref 70–125)
GLUCOSE SERPL-MCNC: 96 MG/DL (ref 70–125)
HCT VFR BLD AUTO: 38.2 % (ref 35–47)
HGB BLD-MCNC: 13 G/DL (ref 12–16)
LYMPHOCYTES # BLD AUTO: 1.7 THOU/UL (ref 0.8–4.4)
LYMPHOCYTES NFR BLD AUTO: 28 % (ref 20–40)
MCH RBC QN AUTO: 31 PG (ref 27–34)
MCHC RBC AUTO-ENTMCNC: 34.1 G/DL (ref 32–36)
MCV RBC AUTO: 91 FL (ref 80–100)
MONOCYTES # BLD AUTO: 0.4 THOU/UL (ref 0–0.9)
MONOCYTES NFR BLD AUTO: 7 % (ref 2–10)
NEUTROPHILS # BLD AUTO: 3.8 THOU/UL (ref 2–7.7)
NEUTROPHILS NFR BLD AUTO: 64 % (ref 50–70)
PLATELET # BLD AUTO: 273 THOU/UL (ref 140–440)
PMV BLD AUTO: 7.6 FL (ref 7–10)
POTASSIUM BLD-SCNC: 4 MMOL/L (ref 3.5–5)
POTASSIUM SERPL-SCNC: 4 MMOL/L (ref 3.5–5)
PROT SERPL-MCNC: 7.4 G/DL (ref 6–8)
RBC # BLD AUTO: 4.2 MILL/UL (ref 3.8–5.4)
SODIUM SERPL-SCNC: 139 MMOL/L (ref 136–145)
TSH SERPL DL<=0.005 MIU/L-ACNC: 0.63 UIU/ML (ref 0.3–5)
WBC: 6 THOU/UL (ref 4–11)

## 2020-09-25 ASSESSMENT — MIFFLIN-ST. JEOR: SCORE: 1597.61

## 2020-09-25 NOTE — ASSESSMENT & PLAN NOTE
Flu shot - declined.   Pap: will do today.   Mammo:  There is no family or personal history, not indicated    Colonoscopy:  There is no family or personal history, not indicated     Std testing desired: declined but offered  Osteoporosis prevention discussed.  vitamin d levels ordered. Recommend daily calcium and vitamin d intake to keep good bone health. Recommend weight bearing exercise, no tobacco, and limit alcohol  dexa  No indication.   Recommend sunscreen, exercise, & healthy diet.  Offered cbc, cmp, lipids and asked what other testing she  desires today  I have had an Advance Directives discussion with the patient.   Body mass index is 33.45 kg/m .   mychart active.

## 2020-09-25 NOTE — ASSESSMENT & PLAN NOTE
Tried to stop zoloft 50mg po qday noted sx worsened.   Trial zoloft 100mg po q day. Plan follow up in 1 month.

## 2020-09-25 NOTE — ASSESSMENT & PLAN NOTE
CURRENTLY RISING- wants to restart active weight loss/  WEIGHT MAINTENANCE PHASE (BMI DOWN FROM 34 TO 33)  Dx: BMI 34.63, in the setting of multiple weight related comorbid conditions including: Chronic reflux esophagitis, severe binge eating disorder, and vitamin D deficiency     REFILLED LOMIRA 8MG PO three times a day - did not seem to work but we have not given it a whole month, she wants to give it a little more time. Refilled. Plan follow up in then next 1-2 months.      Annual exam and Labs at that time for baseline as she restarts weight loss.   Info on smart scale sent.      Follow up in 1 month with me  Follow up asap with dietician  Follow up with 3 pack health coaching asap (wants to try this before considering 24 week plan).      Initial Weight: 208.1 lbs bmi 34.63, 7/17/2018 (restart)  Weight: 208 lb 1.6 oz 7/17/2018   Weight: 206 lb (93.4 kg) bmi 34.28 8/15/2018   Weight: 203 lb (92.1 kg) bmi 33.78 9/12/2018   Weight: 200 lb 14.4 oz (91.1 kg) bmi 33.43, 11/29/2018   Weight: 201 lb (91.2 kg) bmi 33.45, 1/16/2019   Weight: 199 lb (90.3 kg) bmi 33.12, 2/19/2019   Weight: 198 lb 3.2 oz (89.9 kg), BMI, 32, 7/24/2020   Weight: 200 lb (90.7 kg), BMI 33. 9/2020  Weight loss from initial: 8.1  % Weight loss: 3.89 %     LEXAPRO 20 mg orally per day -anxiety and obsessive-compulsive disorder  7/2017-7/2018-weight gain of 30 pounds  Dc 7/17/2018       ZOLOFT 50 mg orally per day-anxiety and OCD  7/17/2018 - 2/19/2019    - doing well feels this is working. - will continue     WELLBUTRIN 150 XL DAILY   8/15/2018 - 7/24/2020  -  DC WELLBUTRIN, WANTS TO RESTART phentermine.       PHENTERMINE  For weight loss  4/19/2016-9/2016-initially lost 15 pounds but then even though she was still taking the phentermine she gained 7 pounds.  9/2016-12/2016 she did not take phentermine and gained 10 pounds during this time  1/2017 -4/2017 -she did not take phentermine and lost 15 pounds  4/2017 - 11/2017 -weight was stable and  went up and down 5 pounds despite the use of phentermine during this time  11/2017 -7/2018-gained 30 pounds probably due to use of Lexapro during this time  7/2018-we will consider use of phentermine again in the future but at this time I would like to start Zoloft and discontinue Lexapro to see how she does once she stabilized on the Zoloft we can consider adding or changing medications.  9/1/2020 - 9/25/2020 - RESTART 8MG PO TID     QSYMIA  7/24/2020 - 9/1/2020 caused GERD. ? topamax component due to phentermine being tolerated in the past     saxenda   1/16/2019 -she did look into this and thought about this at length.  She decided not to do it because of her thyroid abnormality and she is worried about potential for thyroid medullary carcinoma with this drug.        Weaknesses:   -She has not been exercising lately due to gerd 9/1/2020   -Stress  -Genetics  - Depression  -works full time - desk job  - craves sweets, candy, chocolate, chips, crackers     Strengths:   -She is a beenable to maintain the weight loss that she achieved back in 2018, now July 2020 she wants to restart weight loss and lose more  - she does the food shopping  - family supportive of her health goals  - likes veggies  -drinks water

## 2020-09-25 NOTE — ASSESSMENT & PLAN NOTE
Will reorder ultrasound and thyroid function tests and she is over due to see endocrinologist. referral placed.

## 2020-09-25 NOTE — ASSESSMENT & PLAN NOTE
Feels like this worsened. Plans to see GI.    She had planned to do a nissen prior to COVID, and now thinks maybe she needs to go back to that.   Continues protonix 40mg po q day.

## 2020-09-25 NOTE — ASSESSMENT & PLAN NOTE
Vitamin D, Total (25-Hydroxy)   Date Value Ref Range Status   07/18/2018 41.7 30.0 - 80.0 ng/mL Final      Will recheck.

## 2020-09-28 ENCOUNTER — OFFICE VISIT - HEALTHEAST (OUTPATIENT)
Dept: SURGERY | Facility: CLINIC | Age: 35
End: 2020-09-28

## 2020-09-28 DIAGNOSIS — Z82.49 FAMILY HISTORY OF ANEURYSM: ICD-10-CM

## 2020-09-28 DIAGNOSIS — Z84.89 FAMILY HISTORY OF GENETIC DISORDER: Primary | ICD-10-CM

## 2020-09-28 DIAGNOSIS — E66.9 OBESITY: ICD-10-CM

## 2020-09-28 LAB
25(OH)D3 SERPL-MCNC: 36.2 NG/ML (ref 30–80)
25(OH)D3 SERPL-MCNC: 36.2 NG/ML (ref 30–80)
BKR LAB AP ABNORMAL BLEEDING: NO
BKR LAB AP BIRTH CONTROL/HORMONES: NORMAL
BKR LAB AP CERVICAL APPEARANCE: NORMAL
BKR LAB AP GYN ADEQUACY: NORMAL
BKR LAB AP GYN INTERPRETATION: NORMAL
BKR LAB AP GYN OTHER FINDINGS: NORMAL
BKR LAB AP HPV REFLEX: NORMAL
BKR LAB AP LMP: NORMAL
BKR LAB AP PATIENT STATUS: NORMAL
BKR LAB AP PREVIOUS ABNORMAL: NORMAL
BKR LAB AP PREVIOUS NORMAL: 2016
HIGH RISK?: NO
PATH REPORT.COMMENTS IMP SPEC: NORMAL
RESULT FLAG (HE HISTORICAL CONVERSION): NORMAL

## 2020-09-28 NOTE — TELEPHONE ENCOUNTER
Mary called stating she'd like to proceed with her genetic testing, however, prior authorization  on 9/10/2020. Mary asked for PA to initiate again and she will get drawn at the [facility]. I communicated that testing will not initiate until we hear back from insurance.  Mary had no further questions.       JAZMIN Aguilar   Genomics Billing   [facility]  Molecular Diagnostics Laboratory  [address] [place] [address]  [email or web address]   Office: [phone]  Fax: [phone]

## 2020-09-29 ENCOUNTER — HOSPITAL ENCOUNTER (OUTPATIENT)
Dept: ULTRASOUND IMAGING | Facility: CLINIC | Age: 35
Discharge: HOME | End: 2020-09-29
Attending: FAMILY MEDICINE

## 2020-09-29 DIAGNOSIS — E04.9 GOITER, NODULAR: ICD-10-CM

## 2020-09-30 ENCOUNTER — COMMUNICATION - HEALTHEAST (OUTPATIENT)
Dept: FAMILY MEDICINE | Facility: CLINIC | Age: 35
End: 2020-09-30

## 2020-09-30 DIAGNOSIS — Z82.49 FAMILY HISTORY OF ANEURYSM: ICD-10-CM

## 2020-09-30 DIAGNOSIS — Z84.89 FAMILY HISTORY OF GENETIC DISORDER: ICD-10-CM

## 2020-09-30 PROCEDURE — 40000803 ZZHCL STATISTIC DNA ISOL HIGH PURITY: Performed by: INTERNAL MEDICINE

## 2020-10-01 DIAGNOSIS — Z84.89 FAMILY HISTORY OF GENETIC DISORDER: Primary | ICD-10-CM

## 2020-10-01 DIAGNOSIS — Z82.49 FAMILY HISTORY OF ANEURYSM: ICD-10-CM

## 2020-10-01 LAB — COPATH REPORT: NORMAL

## 2020-10-01 PROCEDURE — G0452 MOLECULAR PATHOLOGY INTERPR: HCPCS | Mod: 26 | Performed by: PATHOLOGY

## 2020-10-01 PROCEDURE — 81479 UNLISTED MOLECULAR PATHOLOGY: CPT | Performed by: INTERNAL MEDICINE

## 2020-10-01 NOTE — TELEPHONE ENCOUNTER
I called 10/1/2020 to update Mary on insurance coverage for her genetic testing (PA approval was received). However, I was unable to reach Mary.  I left a non-detailed voicemail with my name and phone number.     JAZMIN Aguilar  Genomics Billing    [facility]   Molecular Diagnostics Laboratory  [address] [place] [address]

## 2020-10-01 NOTE — TELEPHONE ENCOUNTER
Notified Mary,that we received prior authorization approval for genetic testing. Valid from 9/28/2020 to 9/27/2020. Authorization number is [id number].     Explained that insurance benefits may still apply, therefore, there could be an out of pocket cost.    Mary expressed understanding and stated that she wants to proceed with testing. We will call when results are available. Mary had no further questions.    JAZMIN Aguilar  Genomics Billing    [facility]   Molecular Diagnostics Laboratory  [address] [place] [id number] [phone]

## 2020-10-02 ENCOUNTER — COMMUNICATION - HEALTHEAST (OUTPATIENT)
Dept: FAMILY MEDICINE | Facility: CLINIC | Age: 35
End: 2020-10-02

## 2020-10-02 DIAGNOSIS — B37.31 YEAST INFECTION OF THE VAGINA: ICD-10-CM

## 2020-10-05 ENCOUNTER — AMBULATORY - HEALTHEAST (OUTPATIENT)
Dept: LAB | Facility: CLINIC | Age: 35
End: 2020-10-05

## 2020-10-05 DIAGNOSIS — Z13.220 SCREENING, LIPID: ICD-10-CM

## 2020-10-05 DIAGNOSIS — Z13.1 SCREENING FOR DIABETES MELLITUS: ICD-10-CM

## 2020-10-05 LAB
CHOLEST SERPL-MCNC: 193 MG/DL
FASTING STATUS PATIENT QL REPORTED: YES
HBA1C MFR BLD: 5.1 %
HDLC SERPL-MCNC: 61 MG/DL
LDLC SERPL CALC-MCNC: 118 MG/DL
TRIGL SERPL-MCNC: 71 MG/DL

## 2020-10-07 ENCOUNTER — RECORDS - HEALTHEAST (OUTPATIENT)
Dept: ADMINISTRATIVE | Facility: OTHER | Age: 35
End: 2020-10-07

## 2020-10-07 ENCOUNTER — COMMUNICATION - HEALTHEAST (OUTPATIENT)
Dept: FAMILY MEDICINE | Facility: CLINIC | Age: 35
End: 2020-10-07

## 2020-10-14 LAB — COPATH REPORT: NORMAL

## 2020-10-15 ENCOUNTER — TELEPHONE (OUTPATIENT)
Dept: HEMATOLOGY | Facility: CLINIC | Age: 35
End: 2020-10-15

## 2020-10-15 ENCOUNTER — RECORDS - HEALTHEAST (OUTPATIENT)
Dept: ADMINISTRATIVE | Facility: OTHER | Age: 35
End: 2020-10-15

## 2020-10-15 NOTE — Clinical Note
Please print and send a copy of this letter to the patient.    Please enclose test report: Next Generation Sequencing  Order: [id number]   dated 10/1/2020 - print the test report and include with letter.

## 2020-10-15 NOTE — LETTER
October 15, 2020       TO: Mary Manuel  [address] [facility] [address]         Dear Mary,    It was a pleasure speaking with you on the phone on 10/15/2020. Here is a copy of the progress note from our discussion. If you have any additional questions, please feel free to call.      Presenting Information:  I spoke to Mary by phone today to discuss her genetic testing results. Her blood was drawn on 9/30/2020. Next generation sequencing and copy number variation analysis of COL3A1 was ordered from the [facility] Molecular Diagnostics Laboratory. This testing was done because of Mary's reported family history of Mane Danlos syndrome (EDS), in order to assess for vascular EDS before her upcoming surgery.    Genetic Testing Result: NEGATIVE   Mary is negative for mutations in the COL3A1 gene. Harmful mutations in this gene cause vascular Mane Danlos syndrome.  This test included COL3A1 sequencing and deletion/duplication analysis. Other forms of EDS were not assessed for on this test.    Interpretation:  We discussed different interpretations of this negative test result.    1. One explanation is that Mary truly does not have vascular EDS, and therefore her genetic testing was negative.   2. Another explanation may be that she has an unidentifiable mutation in the COL3A1 gene that could not be identified with our current testing methods.  Given that per Dr. Yao's assessment of Mary's medical history being less concerning for vascular EDS and this negative test result, we reviewed that it is very unlikely that Mary has vascular EDS.   3. There may also be another gene, or genes, associated with EDS that we did not test and/or have not yet been discovered.     Additional Testing Considerations:  Mary was unable to obtain additional information from family members regarding their EDS diagnoses. Given concern for bleeding complications during surgery, this test was ordered to  assess for vascular EDS, but does not assess for other forms of this condition. We reviewed again that there are many forms of EDS, and that if she is able to gain additional information on the form of EDS in her family, that would be helpful to know to more accurately assess Mary's chances to have this condition. We discussed that she also has the option to be assessed by a medical geneticist to be evaluated for other forms of EDS. Mary stated that her main concern for now is her upcoming surgery, and that she wanted these results before that procedure.  She should contact me if that is something that she would like to pursue in the future.    Summary:  We do not have a clear genetic explanation for her family history of EDS at this time. I have alerted Dr. Yao to these results, and have asked if any further follow up is needed for Mary in our clinic. I discussed with Mary that Dr. Yao or her nurse should be in contact with her if any further followup is needed.     Plan:  1. I will mail Mary a copy of her test results today.    2. She will follow-up with Dr. Yao if necessary.     If Mary has any further questions, she is encouraged to contact me at [phone].    Time spent on the phone: 5 minutes.    Hilary Martínez MS, [facility]  Licensed, Certified Genetic Counselor  P. [phone]  F. [phone]    Enclosure: Next Generation Sequencing Order: [id number]

## 2020-10-15 NOTE — TELEPHONE ENCOUNTER
10/15/2020    I called Mary today to discuss her genetic test results, but was unable to reach her.  I left a non-detailed voicemail with my name and phone number.    Hilary Martínez MS, [facility]  Licensed, Certified Genetic Counselor  P. [phone]  F. [phone]

## 2020-10-15 NOTE — TELEPHONE ENCOUNTER
"10/15/2020    Presenting Information:  I spoke to Mary by phone today to discuss her genetic testing results. Her blood was drawn on 9/30/2020. Next generation sequencing and copy number variation analysis of COL3A1 was ordered from the [facility] Molecular Diagnostics Laboratory. This testing was done because of Mary's reported family history of Mane Danlos syndrome (EDS), in order to assess for vascular EDS before her upcoming surgery.    Genetic Testing Result: NEGATIVE   Mary is negative for mutations in the COL3A1 gene. Harmful mutations in this gene cause vascular Mane Danlos syndrome.  This test included COL3A1 sequencing and deletion/duplication analysis. Other forms of EDS were not assessed for on this test.    A copy of the test report can be found in the Laboratory tab, dated 10/1/2020, and named \"Next generation sequencing\".    Interpretation:  We discussed different interpretations of this negative test result.    1. One explanation is that Mary truly does not have vascular EDS, and therefore her genetic testing was negative.   2. Another explanation may be that she has an unidentifiable mutation in the COL3A1 gene that could not be identified with our current testing methods.  Given that per Dr. Yao's assessment of Mary's medical history being less concerning for vascular EDS and this negative test result, we reviewed that it is very unlikely that Mary has vascular EDS.   3. There may also be another gene, or genes, associated with EDS that we did not test and/or have not yet been discovered.     Additional Testing Considerations:  Mary was unable to obtain additional information from family members regarding their EDS diagnoses. Given concern for bleeding complications during surgery, this test was ordered to assess for vascular EDS, but does not assess for other forms of this condition. We reviewed again that there are many forms of EDS, and that if she is able to gain " additional information on the form of EDS in her family, that would be helpful to know to more accurately assess Mary's chances to have this condition. We discussed that she also has the option to be assessed by a medical geneticist to be evaluated for other forms of EDS. Mary stated that her main concern for now is her upcoming surgery, and that she wanted these results before that procedure.  She should contact me if that is something that she would like to pursue in the future.    Summary:  We do not have a clear genetic explanation for her family history of EDS at this time. I have alerted Dr. Yao to these results, and have asked if any further follow up is needed for Mary in our clinic. I discussed with Mary that Dr. Yao or her nurse should be in contact with her if any further followup is needed.     Plan:  1. I will mail Mary a copy of her test results today.    2. She will follow-up with Dr. Yao if necessary.     If Mary has any further questions, she is encouraged to contact me at [phone].    Time spent on the phone: 5 minutes.    Hilary Martínez MS, [facility]  Licensed, Certified Genetic Counselor  P. [phone]  F. [phone]

## 2020-10-18 ENCOUNTER — COMMUNICATION - HEALTHEAST (OUTPATIENT)
Dept: FAMILY MEDICINE | Facility: CLINIC | Age: 35
End: 2020-10-18

## 2020-10-18 DIAGNOSIS — F42.2 MIXED OBSESSIONAL THOUGHTS AND ACTS: ICD-10-CM

## 2020-10-22 ENCOUNTER — RECORDS - HEALTHEAST (OUTPATIENT)
Dept: ADMINISTRATIVE | Facility: OTHER | Age: 35
End: 2020-10-22

## 2020-10-22 ENCOUNTER — VIRTUAL VISIT (OUTPATIENT)
Dept: ENDOCRINOLOGY | Facility: CLINIC | Age: 35
End: 2020-10-22
Payer: COMMERCIAL

## 2020-10-22 DIAGNOSIS — E04.1 THYROID NODULE: Primary | ICD-10-CM

## 2020-10-22 PROCEDURE — 99202 OFFICE O/P NEW SF 15 MIN: CPT | Mod: TEL | Performed by: INTERNAL MEDICINE

## 2020-10-22 NOTE — Clinical Note
Please   CC:  Christine Plaza MD    [address] [facility]    [place] [id number] [phone] (Fax)

## 2020-10-22 NOTE — PROGRESS NOTES
"Mary Manuel is a 35 year old female who is being evaluated via a billable telephone visit.      The patient has been notified of following:     \"This telephone visit will be conducted via a call between you and your physician/provider. We have found that certain health care needs can be provided without the need for a physical exam.  This service lets us provide the care you need with a short phone conversation.  If a prescription is necessary we can send it directly to your pharmacy.  If lab work is needed we can place an order for that and you can then stop by our lab to have the test done at a later time.    Telephone visits are billed at different rates depending on your insurance coverage. During this emergency period, for some insurers they may be billed the same as an in-person visit.  Please reach out to your insurance provider with any questions.    If during the course of the call the physician/provider feels a telephone visit is not appropriate, you will not be charged for this service.\"    Patient has given verbal consent for Telephone visit?  Yes    What phone number would you like to be contacted at? [phone]    How would you like to obtain your AVS? MyChart    CC: Thyroid nodules.     HPI:   Patient presents for evaluation of thyroid nodules.   Noticed on physical exam.     US 4/21/16:   Right thyroid lobe measures 6.1 x 2.2 x 2.0 cm. Two subcentimeter nodules involve the right lobe of the thyroid. The largest measures 8 mm in greatest dimension at the inferior aspect and is heterogeneous in appearance without internal flow by color   Doppler or internal calcification. Within the midportion of the right lobe is a 6 mm heterogeneous nodule without internal calcification with a minimal amount of internal flow by color Doppler.      Left thyroid lobe measures 5.8 x 2.1 x 1.6 cm. There is a 6 mm mildly hypoechoic nodule within the midportion of the left lobe with some internal flow by color " Doppler and without internal calcification.    Isthmus measures 5 mm. No additional findings.    No cervical lymphadenopathy.    US 4/27/17:  Right thyroid lobe measures 6.8 x 1.6 x 2 cm. Inhomogeneous echotexture there is a small nodule in the midportion of the right lobe measuring 0.5 x 0.5 x 0.4 cm. On the prior study it measured 0.6 x 0.5 x 0.4 cm. There is a second small nodule at the   inferior pole of the right lobe measuring 0.6 x 0.6 x 0.4 cm. On the prior study it measured 0.8 x 0.6 x 0.5 cm. There are no dominant nodules greater than a centimeter.      Left thyroid lobe measures 6.4 x 2.5 x 1.5 cm. cm. There is a small circumscribed hypoechoic nodule in the midportion of the left lobe measuring 0.5 x 0.4 x 0.3 cm. On the prior study the nodule measured 0.6 x 0.4 x 0.3 cm. There are no dominant nodules   greater than a centimeter.    Isthmus measures 4 mm. No additional findings.    No cervical lymphadenopathy.    US 12/13/18:  RIGHT thyroid lobe measures 6.0 x 1.8 x 1.7 cm. 10 mm avascular colloid cyst with internal clot at the lower pole. 6 mm solid nodule at the upper pole unchanged and not clinically significant. No dominant nodule. Normal background texture.    LEFT thyroid lobe measures 6.4 x 2.2 x 1.8 cm. A few tiny 5 mm and smaller solid nodules not clinically significant or changed from previous. No dominant nodule. Normal background texture.    Isthmus measures 5 mm. No additional findings.    No cervical lymphadenopathy.      US 9/29/20:  RIGHT lobe: 6 x 2 x 1.7 cm. Mildly inhomogeneous with small nodules that are unchanged from the prior study.  Isthmus: 5 mm.  LEFT lobe: 6.1 x 1.9 x 2 cm. Mildly inhomogeneous with small subcentimeter nodules which are unchanged from the prior study.    NECK: No cervical lymphadenopathy.    NODULES:    Nodule 1: 0.7 x 0.6 x 0.3 cm right upper pole medially, on the prior study 0.6 x 0.7 x 0.4 cm   Composition: Solid or almost completely solid, 2 points    Echogenicity: Unable to determine, 1 point   Shape: Not taller than wide, 0 points   Margin: Smooth, 0 points   Echogenic Foci: None, or large comet-tail artifacts, 0 points   Point Total: 3 points. TI-RADS 3. If 2.5 cm or larger, recommend FNA; if 1.5 cm or larger, recommend follow up US at 1, 3, and 5 years.      Nodule 2: 0.9 x 0.6 x 0.6 cm, on the prior study 1 x 1 x 0.7 cm.  Composition: Mixed cystic and solid, 1 point   Echogenicity: Hyperechoic or isoechoic, 1 point   Shape: Not taller than wide, 0 points   Margin: Smooth, 0 points   Echogenic Foci: None, or large comet-tail artifacts, 0 points   Point Total: 1-2 points. TI-RADS 2. No FNA.    Nodule 3: 0.6 x 0.6 x 0.3 cm mid left lobe  Composition: Solid or almost completely solid, 2 points   Echogenicity: Hyperechoic or isoechoic, 1 point   Shape: Not taller than wide, 0 points   Margin: Ill-defined, 0 points   Echogenic Foci: None, or large comet-tail artifacts, 0 points   Point Total: 3 points. TI-RADS 3. If 2.5 cm or larger, recommend FNA; if 1.5 cm or larger, recommend follow up US at 1, 3, and 5 years.    Nodule 4: 0.5 x 0.5 x 0.3 cm inferior left lobe laterally, prior study 0.5 x 0.4 x 0.3 cm.  Composition: Cystic or almost completely cystic, 0 points   Echogenicity: Anechoic, 0 points   Shape: Not taller than wide, 0 points   Margin: Smooth, 0 points   Echogenic Foci: None, or large comet-tail artifacts, 0 points   Point Total: 0 points. TI-RADS 1. No FNA.    She had any FNA to date.   No dysphagia, choking sensation, voice changes.   Chronic burning in her throat she attributes to acid reflux.     No prior work with radiation.     ROS: 10 point ROS neg other than the symptoms noted above in the HPI.    PMH:   Migraine   OCD  Obesity   Vitamin D  GERD     Meds:  Current Outpatient Medications   Medication     pantoprazole (PROTONIX) 40 MG EC tablet     phentermine (LOMAIRA) 8 MG tablet     sertraline (ZOLOFT) 50 MG tablet     No current  facility-administered medications for this visit.      FHX:   No thyroid disease.     SHX:  Non-smoker.     Exam:   Gen: In NAD.     A/P:   Thyroid nodules - TSH normal. I have reviewed the natural history of thyroid nodules and thyroid cancer with the patient. Her nodules have not increased significantly in size nor do they meet TI-RADS biopsy criteria. She is not having any compressive symptoms.   -TSH in 1 year.   -US in 2 years unless local symptoms developed.     Due to the COVID 19 pandemic this visit was a telephone/video visit in order to help prevent spread of infection in this high risk patient and the general population. The patient gave verbal consent for the visit today.    Start time 1434  Stop time 1450  Total time 16  This visit would have been billed as 18959 as an E & M code     Corey Mckeon MD on 10/22/2020 at 2:50 PM      CC:  Christine Plaza MD    [address], [place] [id number] [phone] (Fax)

## 2020-10-22 NOTE — LETTER
"    10/22/2020         RE: Mary Manuel  [address] [facility] [place] [address]        Dear Colleague,    Thank you for referring your patient, Mary Manuel, to the [facility] ENDOCRINOLOGY. Please see a copy of my visit note below.    Mary Manuel is a 35 year old female who is being evaluated via a billable telephone visit.      The patient has been notified of following:     \"This telephone visit will be conducted via a call between you and your physician/provider. We have found that certain health care needs can be provided without the need for a physical exam.  This service lets us provide the care you need with a short phone conversation.  If a prescription is necessary we can send it directly to your pharmacy.  If lab work is needed we can place an order for that and you can then stop by our lab to have the test done at a later time.    Telephone visits are billed at different rates depending on your insurance coverage. During this emergency period, for some insurers they may be billed the same as an in-person visit.  Please reach out to your insurance provider with any questions.    If during the course of the call the physician/provider feels a telephone visit is not appropriate, you will not be charged for this service.\"    Patient has given verbal consent for Telephone visit?  Yes    What phone number would you like to be contacted at? [phone]    How would you like to obtain your AVS? Junaidhart    CC: Thyroid nodules.     HPI:   Patient presents for evaluation of thyroid nodules.   Noticed on physical exam.     US 4/21/16:   Right thyroid lobe measures 6.1 x 2.2 x 2.0 cm. Two subcentimeter nodules involve the right lobe of the thyroid. The largest measures 8 mm in greatest dimension at the inferior aspect and is heterogeneous in appearance without internal flow by color   Doppler or internal calcification. Within the midportion of the right lobe is a 6 mm heterogeneous " nodule without internal calcification with a minimal amount of internal flow by color Doppler.      Left thyroid lobe measures 5.8 x 2.1 x 1.6 cm. There is a 6 mm mildly hypoechoic nodule within the midportion of the left lobe with some internal flow by color Doppler and without internal calcification.    Isthmus measures 5 mm. No additional findings.    No cervical lymphadenopathy.    US 4/27/17:  Right thyroid lobe measures 6.8 x 1.6 x 2 cm. Inhomogeneous echotexture there is a small nodule in the midportion of the right lobe measuring 0.5 x 0.5 x 0.4 cm. On the prior study it measured 0.6 x 0.5 x 0.4 cm. There is a second small nodule at the   inferior pole of the right lobe measuring 0.6 x 0.6 x 0.4 cm. On the prior study it measured 0.8 x 0.6 x 0.5 cm. There are no dominant nodules greater than a centimeter.      Left thyroid lobe measures 6.4 x 2.5 x 1.5 cm. cm. There is a small circumscribed hypoechoic nodule in the midportion of the left lobe measuring 0.5 x 0.4 x 0.3 cm. On the prior study the nodule measured 0.6 x 0.4 x 0.3 cm. There are no dominant nodules   greater than a centimeter.    Isthmus measures 4 mm. No additional findings.    No cervical lymphadenopathy.    US 12/13/18:  RIGHT thyroid lobe measures 6.0 x 1.8 x 1.7 cm. 10 mm avascular colloid cyst with internal clot at the lower pole. 6 mm solid nodule at the upper pole unchanged and not clinically significant. No dominant nodule. Normal background texture.    LEFT thyroid lobe measures 6.4 x 2.2 x 1.8 cm. A few tiny 5 mm and smaller solid nodules not clinically significant or changed from previous. No dominant nodule. Normal background texture.    Isthmus measures 5 mm. No additional findings.    No cervical lymphadenopathy.      US 9/29/20:  RIGHT lobe: 6 x 2 x 1.7 cm. Mildly inhomogeneous with small nodules that are unchanged from the prior study.  Isthmus: 5 mm.  LEFT lobe: 6.1 x 1.9 x 2 cm. Mildly inhomogeneous with small subcentimeter  nodules which are unchanged from the prior study.    NECK: No cervical lymphadenopathy.    NODULES:    Nodule 1: 0.7 x 0.6 x 0.3 cm right upper pole medially, on the prior study 0.6 x 0.7 x 0.4 cm   Composition: Solid or almost completely solid, 2 points   Echogenicity: Unable to determine, 1 point   Shape: Not taller than wide, 0 points   Margin: Smooth, 0 points   Echogenic Foci: None, or large comet-tail artifacts, 0 points   Point Total: 3 points. TI-RADS 3. If 2.5 cm or larger, recommend FNA; if 1.5 cm or larger, recommend follow up US at 1, 3, and 5 years.      Nodule 2: 0.9 x 0.6 x 0.6 cm, on the prior study 1 x 1 x 0.7 cm.  Composition: Mixed cystic and solid, 1 point   Echogenicity: Hyperechoic or isoechoic, 1 point   Shape: Not taller than wide, 0 points   Margin: Smooth, 0 points   Echogenic Foci: None, or large comet-tail artifacts, 0 points   Point Total: 1-2 points. TI-RADS 2. No FNA.    Nodule 3: 0.6 x 0.6 x 0.3 cm mid left lobe  Composition: Solid or almost completely solid, 2 points   Echogenicity: Hyperechoic or isoechoic, 1 point   Shape: Not taller than wide, 0 points   Margin: Ill-defined, 0 points   Echogenic Foci: None, or large comet-tail artifacts, 0 points   Point Total: 3 points. TI-RADS 3. If 2.5 cm or larger, recommend FNA; if 1.5 cm or larger, recommend follow up US at 1, 3, and 5 years.    Nodule 4: 0.5 x 0.5 x 0.3 cm inferior left lobe laterally, prior study 0.5 x 0.4 x 0.3 cm.  Composition: Cystic or almost completely cystic, 0 points   Echogenicity: Anechoic, 0 points   Shape: Not taller than wide, 0 points   Margin: Smooth, 0 points   Echogenic Foci: None, or large comet-tail artifacts, 0 points   Point Total: 0 points. TI-RADS 1. No FNA.    She had any FNA to date.   No dysphagia, choking sensation, voice changes.   Chronic burning in her throat she attributes to acid reflux.     No prior work with radiation.     ROS: 10 point ROS neg other than the symptoms noted above in the  HPI.    PMH:   Migraine   OCD  Obesity   Vitamin D  GERD     Meds:  Current Outpatient Medications   Medication     pantoprazole (PROTONIX) 40 MG EC tablet     phentermine (LOMAIRA) 8 MG tablet     sertraline (ZOLOFT) 50 MG tablet     No current facility-administered medications for this visit.      FHX:   No thyroid disease.     SHX:  Non-smoker.     Exam:   Gen: In NAD.     A/P:   Thyroid nodules - TSH normal. I have reviewed the natural history of thyroid nodules and thyroid cancer with the patient. Her nodules have not increased significantly in size nor do they meet TI-RADS biopsy criteria. She is not having any compressive symptoms.   -TSH in 1 year.   -US in 2 years unless local symptoms developed.     Due to the COVID 19 pandemic this visit was a telephone/video visit in order to help prevent spread of infection in this high risk patient and the general population. The patient gave verbal consent for the visit today.    Start time 1434  Stop time 1450  Total time 16  This visit would have been billed as [id number] as an E & M code     Corey Mckeon MD on 10/22/2020 at 2:50 PM      CC:  Christine Plaza MD    [address] [place] [address]    [phone] (Fax)                  Again, thank you for allowing me to participate in the care of your patient.        Sincerely,        Corey Mckeon MD

## 2020-10-22 NOTE — LETTER
"    10/22/2020         RE: Mary Manuel  [address] [facility] [place] [address]        Dear Colleague,    Thank you for referring your patient, Mary Manuel, to the [facility] ENDOCRINOLOGY. Please see a copy of my visit note below.    Mary Manuel is a 35 year old female who is being evaluated via a billable telephone visit.      The patient has been notified of following:     \"This telephone visit will be conducted via a call between you and your physician/provider. We have found that certain health care needs can be provided without the need for a physical exam.  This service lets us provide the care you need with a short phone conversation.  If a prescription is necessary we can send it directly to your pharmacy.  If lab work is needed we can place an order for that and you can then stop by our lab to have the test done at a later time.    Telephone visits are billed at different rates depending on your insurance coverage. During this emergency period, for some insurers they may be billed the same as an in-person visit.  Please reach out to your insurance provider with any questions.    If during the course of the call the physician/provider feels a telephone visit is not appropriate, you will not be charged for this service.\"    Patient has given verbal consent for Telephone visit?  Yes    What phone number would you like to be contacted at? [phone]    How would you like to obtain your AVS? Junaidhart    CC: Thyroid nodules.     HPI:   Patient presents for evaluation of thyroid nodules.   Noticed on physical exam.     US 4/21/16:   Right thyroid lobe measures 6.1 x 2.2 x 2.0 cm. Two subcentimeter nodules involve the right lobe of the thyroid. The largest measures 8 mm in greatest dimension at the inferior aspect and is heterogeneous in appearance without internal flow by color   Doppler or internal calcification. Within the midportion of the right lobe is a 6 mm heterogeneous " nodule without internal calcification with a minimal amount of internal flow by color Doppler.      Left thyroid lobe measures 5.8 x 2.1 x 1.6 cm. There is a 6 mm mildly hypoechoic nodule within the midportion of the left lobe with some internal flow by color Doppler and without internal calcification.    Isthmus measures 5 mm. No additional findings.    No cervical lymphadenopathy.    US 4/27/17:  Right thyroid lobe measures 6.8 x 1.6 x 2 cm. Inhomogeneous echotexture there is a small nodule in the midportion of the right lobe measuring 0.5 x 0.5 x 0.4 cm. On the prior study it measured 0.6 x 0.5 x 0.4 cm. There is a second small nodule at the   inferior pole of the right lobe measuring 0.6 x 0.6 x 0.4 cm. On the prior study it measured 0.8 x 0.6 x 0.5 cm. There are no dominant nodules greater than a centimeter.      Left thyroid lobe measures 6.4 x 2.5 x 1.5 cm. cm. There is a small circumscribed hypoechoic nodule in the midportion of the left lobe measuring 0.5 x 0.4 x 0.3 cm. On the prior study the nodule measured 0.6 x 0.4 x 0.3 cm. There are no dominant nodules   greater than a centimeter.    Isthmus measures 4 mm. No additional findings.    No cervical lymphadenopathy.    US 12/13/18:  RIGHT thyroid lobe measures 6.0 x 1.8 x 1.7 cm. 10 mm avascular colloid cyst with internal clot at the lower pole. 6 mm solid nodule at the upper pole unchanged and not clinically significant. No dominant nodule. Normal background texture.    LEFT thyroid lobe measures 6.4 x 2.2 x 1.8 cm. A few tiny 5 mm and smaller solid nodules not clinically significant or changed from previous. No dominant nodule. Normal background texture.    Isthmus measures 5 mm. No additional findings.    No cervical lymphadenopathy.      US 9/29/20:  RIGHT lobe: 6 x 2 x 1.7 cm. Mildly inhomogeneous with small nodules that are unchanged from the prior study.  Isthmus: 5 mm.  LEFT lobe: 6.1 x 1.9 x 2 cm. Mildly inhomogeneous with small subcentimeter  nodules which are unchanged from the prior study.    NECK: No cervical lymphadenopathy.    NODULES:    Nodule 1: 0.7 x 0.6 x 0.3 cm right upper pole medially, on the prior study 0.6 x 0.7 x 0.4 cm   Composition: Solid or almost completely solid, 2 points   Echogenicity: Unable to determine, 1 point   Shape: Not taller than wide, 0 points   Margin: Smooth, 0 points   Echogenic Foci: None, or large comet-tail artifacts, 0 points   Point Total: 3 points. TI-RADS 3. If 2.5 cm or larger, recommend FNA; if 1.5 cm or larger, recommend follow up US at 1, 3, and 5 years.      Nodule 2: 0.9 x 0.6 x 0.6 cm, on the prior study 1 x 1 x 0.7 cm.  Composition: Mixed cystic and solid, 1 point   Echogenicity: Hyperechoic or isoechoic, 1 point   Shape: Not taller than wide, 0 points   Margin: Smooth, 0 points   Echogenic Foci: None, or large comet-tail artifacts, 0 points   Point Total: 1-2 points. TI-RADS 2. No FNA.    Nodule 3: 0.6 x 0.6 x 0.3 cm mid left lobe  Composition: Solid or almost completely solid, 2 points   Echogenicity: Hyperechoic or isoechoic, 1 point   Shape: Not taller than wide, 0 points   Margin: Ill-defined, 0 points   Echogenic Foci: None, or large comet-tail artifacts, 0 points   Point Total: 3 points. TI-RADS 3. If 2.5 cm or larger, recommend FNA; if 1.5 cm or larger, recommend follow up US at 1, 3, and 5 years.    Nodule 4: 0.5 x 0.5 x 0.3 cm inferior left lobe laterally, prior study 0.5 x 0.4 x 0.3 cm.  Composition: Cystic or almost completely cystic, 0 points   Echogenicity: Anechoic, 0 points   Shape: Not taller than wide, 0 points   Margin: Smooth, 0 points   Echogenic Foci: None, or large comet-tail artifacts, 0 points   Point Total: 0 points. TI-RADS 1. No FNA.    She had any FNA to date.   No dysphagia, choking sensation, voice changes.   Chronic burning in her throat she attributes to acid reflux.     No prior work with radiation.     ROS: 10 point ROS neg other than the symptoms noted above in the  HPI.    PMH:   Migraine   OCD  Obesity   Vitamin D  GERD     Meds:  Current Outpatient Medications   Medication     pantoprazole (PROTONIX) 40 MG EC tablet     phentermine (LOMAIRA) 8 MG tablet     sertraline (ZOLOFT) 50 MG tablet     No current facility-administered medications for this visit.      FHX:   No thyroid disease.     SHX:  Non-smoker.     Exam:   Gen: In NAD.     A/P:   Thyroid nodules - TSH normal. I have reviewed the natural history of thyroid nodules and thyroid cancer with the patient. Her nodules have not increased significantly in size nor do they meet TI-RADS biopsy criteria. She is not having any compressive symptoms.   -TSH in 1 year.   -US in 2 years unless local symptoms developed.     Due to the COVID 19 pandemic this visit was a telephone/video visit in order to help prevent spread of infection in this high risk patient and the general population. The patient gave verbal consent for the visit today.    Start time 1434  Stop time 1450  Total time 16  This visit would have been billed as [id number] as an E & M code     Corey Mckeon MD on 10/22/2020 at 2:50 PM      CC:  Christine Plaza MD    [address] [place] [address]    [phone] (Fax)                  Again, thank you for allowing me to participate in the care of your patient.        Sincerely,        Corey Mckeon MD

## 2020-10-22 NOTE — PROGRESS NOTES
Copy of OV note faxed as requested to:  Christine Plaza MD    [address] [place] [address] [phone] (Fax)      Danette PISANO MA

## 2020-10-26 ENCOUNTER — COMMUNICATION - HEALTHEAST (OUTPATIENT)
Dept: SURGERY | Facility: CLINIC | Age: 35
End: 2020-10-26

## 2020-10-26 ENCOUNTER — COMMUNICATION - HEALTHEAST (OUTPATIENT)
Dept: FAMILY MEDICINE | Facility: CLINIC | Age: 35
End: 2020-10-26

## 2020-10-28 ENCOUNTER — OFFICE VISIT - HEALTHEAST (OUTPATIENT)
Dept: FAMILY MEDICINE | Facility: CLINIC | Age: 35
End: 2020-10-28

## 2020-10-28 ENCOUNTER — COMMUNICATION - HEALTHEAST (OUTPATIENT)
Dept: FAMILY MEDICINE | Facility: CLINIC | Age: 35
End: 2020-10-28

## 2020-10-28 DIAGNOSIS — Z00.00 ENCOUNTER FOR PREVENTIVE CARE: ICD-10-CM

## 2020-10-28 DIAGNOSIS — R21 BUTTERFLY RASH: ICD-10-CM

## 2020-10-28 DIAGNOSIS — E04.9 GOITER, NODULAR: ICD-10-CM

## 2020-10-28 LAB
ALBUMIN UR-MCNC: ABNORMAL MG/DL
APPEARANCE UR: CLEAR
BACTERIA #/AREA URNS HPF: ABNORMAL HPF
BASOPHILS # BLD AUTO: 0 THOU/UL (ref 0–0.2)
BASOPHILS NFR BLD AUTO: 1 % (ref 0–2)
BILIRUB UR QL STRIP: ABNORMAL
C REACTIVE PROTEIN LHE: 0.2 MG/DL (ref 0–0.8)
COLOR UR AUTO: YELLOW
EOSINOPHIL # BLD AUTO: 0.1 THOU/UL (ref 0–0.4)
EOSINOPHIL NFR BLD AUTO: 2 % (ref 0–6)
ERYTHROCYTE [DISTWIDTH] IN BLOOD BY AUTOMATED COUNT: 12.1 % (ref 11–14.5)
ERYTHROCYTE [SEDIMENTATION RATE] IN BLOOD BY WESTERGREN METHOD: 12 MM/HR (ref 0–20)
GLUCOSE UR STRIP-MCNC: NEGATIVE MG/DL
HCT VFR BLD AUTO: 38.7 % (ref 35–47)
HGB BLD-MCNC: 13.1 G/DL (ref 12–16)
HGB UR QL STRIP: NEGATIVE
KETONES UR STRIP-MCNC: NEGATIVE MG/DL
LEUKOCYTE ESTERASE UR QL STRIP: NEGATIVE
LYMPHOCYTES # BLD AUTO: 1.6 THOU/UL (ref 0.8–4.4)
LYMPHOCYTES NFR BLD AUTO: 26 % (ref 20–40)
MCH RBC QN AUTO: 31.5 PG (ref 27–34)
MCHC RBC AUTO-ENTMCNC: 33.8 G/DL (ref 32–36)
MCV RBC AUTO: 93 FL (ref 80–100)
MONOCYTES # BLD AUTO: 0.5 THOU/UL (ref 0–0.9)
MONOCYTES NFR BLD AUTO: 8 % (ref 2–10)
MUCOUS THREADS #/AREA URNS LPF: ABNORMAL LPF
NEUTROPHILS # BLD AUTO: 4.1 THOU/UL (ref 2–7.7)
NEUTROPHILS NFR BLD AUTO: 64 % (ref 50–70)
NITRATE UR QL: NEGATIVE
PH UR STRIP: 7 [PH] (ref 5–8)
PLATELET # BLD AUTO: 231 THOU/UL (ref 140–440)
PMV BLD AUTO: 7.4 FL (ref 7–10)
RBC # BLD AUTO: 4.16 MILL/UL (ref 3.8–5.4)
RBC #/AREA URNS AUTO: ABNORMAL HPF
SP GR UR STRIP: 1.02 (ref 1–1.03)
SQUAMOUS #/AREA URNS AUTO: ABNORMAL LPF
UROBILINOGEN UR STRIP-ACNC: ABNORMAL
WBC #/AREA URNS AUTO: ABNORMAL HPF
WBC: 6.4 THOU/UL (ref 4–11)

## 2020-10-28 NOTE — ASSESSMENT & PLAN NOTE
Patient presents today with a rash today on her face. She has been seeing her dermatologist but it is not improving. The dermatologist has been treating her for rosacea.  She says she does not have notes, and I also do not the derm notes. But looking at her facial rash I see that the dermatologist must have worried about lupus and if this rash might represent a butterfly rash although it is not completely typical.    The patient also has granuloma annulare - so worry is that she has undiagnosed lupus or other autoimmune process.    With both Granuloma annulare  And new Butterfly rash, I will Check jermaine, ua (looking for proteinuria, casts, pyuria), sed rate and crp, serum protein electrophoresis.  If anything is positive plan follow up. If normal follow up prn.

## 2020-10-30 LAB
ALBUMIN PERCENT: 64.7 % (ref 51–67)
ALBUMIN SERPL ELPH-MCNC: 4.6 G/DL (ref 3.2–4.7)
ALPHA 1 PERCENT: 2.2 % (ref 2–4)
ALPHA 2 PERCENT: 10 % (ref 5–13)
ALPHA1 GLOB SERPL ELPH-MCNC: 0.2 G/DL (ref 0.1–0.3)
ALPHA2 GLOB SERPL ELPH-MCNC: 0.7 G/DL (ref 0.4–0.9)
ANA SER QL: 0.5 U
B-GLOBULIN SERPL ELPH-MCNC: 0.9 G/DL (ref 0.7–1.2)
BETA PERCENT: 13.1 % (ref 10–17)
GAMMA GLOB SERPL ELPH-MCNC: 0.7 G/DL (ref 0.6–1.4)
GAMMA GLOBULIN PERCENT: 10 % (ref 9–20)
PATH ICD:: NORMAL
PROT PATTERN SERPL ELPH-IMP: NORMAL
PROT SERPL-MCNC: 7.1 G/DL (ref 6–8)
REVIEWING PATHOLOGIST: NORMAL

## 2020-11-02 ENCOUNTER — RECORDS - HEALTHEAST (OUTPATIENT)
Dept: ADMINISTRATIVE | Facility: OTHER | Age: 35
End: 2020-11-02

## 2020-11-03 ENCOUNTER — RECORDS - HEALTHEAST (OUTPATIENT)
Dept: ADMINISTRATIVE | Facility: OTHER | Age: 35
End: 2020-11-03

## 2020-11-14 ENCOUNTER — RECORDS - HEALTHEAST (OUTPATIENT)
Dept: ADMINISTRATIVE | Facility: OTHER | Age: 35
End: 2020-11-14

## 2020-11-27 ENCOUNTER — OFFICE VISIT - HEALTHEAST (OUTPATIENT)
Dept: FAMILY MEDICINE | Facility: CLINIC | Age: 35
End: 2020-11-27

## 2020-11-27 DIAGNOSIS — Z01.818 PREOPERATIVE EXAMINATION: ICD-10-CM

## 2020-11-27 DIAGNOSIS — K21.00 GASTROESOPHAGEAL REFLUX DISEASE WITH ESOPHAGITIS, UNSPECIFIED WHETHER HEMORRHAGE: ICD-10-CM

## 2020-11-27 DIAGNOSIS — K44.9 DIAPHRAGMATIC HERNIA WITHOUT OBSTRUCTION AND WITHOUT GANGRENE: ICD-10-CM

## 2020-11-27 LAB
ANION GAP SERPL CALCULATED.3IONS-SCNC: 11 MMOL/L (ref 5–18)
BUN SERPL-MCNC: 15 MG/DL (ref 8–22)
CALCIUM SERPL-MCNC: 9.3 MG/DL (ref 8.5–10.5)
CHLORIDE BLD-SCNC: 106 MMOL/L (ref 98–107)
CO2 SERPL-SCNC: 23 MMOL/L (ref 22–31)
CREAT SERPL-MCNC: 0.84 MG/DL (ref 0.6–1.1)
GFR SERPL CREATININE-BSD FRML MDRD: >60 ML/MIN/1.73M2
GLUCOSE BLD-MCNC: 102 MG/DL (ref 70–125)
HCG UR QL: NEGATIVE
HGB BLD-MCNC: 12.8 G/DL (ref 12–16)
POTASSIUM BLD-SCNC: 3.7 MMOL/L (ref 3.5–5)
SODIUM SERPL-SCNC: 140 MMOL/L (ref 136–145)

## 2020-11-27 ASSESSMENT — MIFFLIN-ST. JEOR: SCORE: 1632.76

## 2020-11-27 NOTE — ASSESSMENT & PLAN NOTE
Surgery/Procedure: HIATAL HERNIA REPAIR WITH TOUPET FUNDOPLICATION  Surgery Location: [facility]  Surgeon: Dr.Eric Plata  Surgery Date: Wednesday 12/2/2020  Time of Surgery: 12pm

## 2020-11-27 NOTE — ASSESSMENT & PLAN NOTE
Surgery/Procedure: HIATAL HERNIA REPAIR WITH TOUPET FUNDOPLICATION  Surgery Location: [facility]  Surgeon: Dr.Eric Plata  Surgery Date: Wednesday 12/2/2020  Time of Surgery: 12pm

## 2020-12-28 ENCOUNTER — COMMUNICATION - HEALTHEAST (OUTPATIENT)
Dept: FAMILY MEDICINE | Facility: CLINIC | Age: 35
End: 2020-12-28

## 2020-12-28 DIAGNOSIS — F42.2 MIXED OBSESSIONAL THOUGHTS AND ACTS: ICD-10-CM

## 2020-12-29 ENCOUNTER — COMMUNICATION - HEALTHEAST (OUTPATIENT)
Dept: FAMILY MEDICINE | Facility: CLINIC | Age: 35
End: 2020-12-29

## 2020-12-29 DIAGNOSIS — F42.2 MIXED OBSESSIONAL THOUGHTS AND ACTS: ICD-10-CM

## 2021-01-04 ENCOUNTER — HEALTH MAINTENANCE LETTER (OUTPATIENT)
Age: 36
End: 2021-01-04

## 2021-01-22 ENCOUNTER — COMMUNICATION - HEALTHEAST (OUTPATIENT)
Dept: FAMILY MEDICINE | Facility: CLINIC | Age: 36
End: 2021-01-22

## 2021-01-22 DIAGNOSIS — F42.2 MIXED OBSESSIONAL THOUGHTS AND ACTS: ICD-10-CM

## 2021-01-25 ENCOUNTER — COMMUNICATION - HEALTHEAST (OUTPATIENT)
Dept: FAMILY MEDICINE | Facility: CLINIC | Age: 36
End: 2021-01-25

## 2021-01-25 DIAGNOSIS — F42.2 MIXED OBSESSIONAL THOUGHTS AND ACTS: ICD-10-CM

## 2021-01-25 ASSESSMENT — ANXIETY QUESTIONNAIRES
6. BECOMING EASILY ANNOYED OR IRRITABLE: NOT AT ALL
2. NOT BEING ABLE TO STOP OR CONTROL WORRYING: NOT AT ALL
3. WORRYING TOO MUCH ABOUT DIFFERENT THINGS: NOT AT ALL
GAD7 TOTAL SCORE: 0
1. FEELING NERVOUS, ANXIOUS, OR ON EDGE: NOT AT ALL
7. FEELING AFRAID AS IF SOMETHING AWFUL MIGHT HAPPEN: NOT AT ALL
4. TROUBLE RELAXING: NOT AT ALL
5. BEING SO RESTLESS THAT IT IS HARD TO SIT STILL: NOT AT ALL

## 2021-01-27 ENCOUNTER — COMMUNICATION - HEALTHEAST (OUTPATIENT)
Dept: FAMILY MEDICINE | Facility: CLINIC | Age: 36
End: 2021-01-27

## 2021-01-27 ENCOUNTER — OFFICE VISIT - HEALTHEAST (OUTPATIENT)
Dept: FAMILY MEDICINE | Facility: CLINIC | Age: 36
End: 2021-01-27

## 2021-01-27 DIAGNOSIS — Z30.8 ENCOUNTER FOR OTHER CONTRACEPTIVE MANAGEMENT: ICD-10-CM

## 2021-01-27 DIAGNOSIS — B37.31 YEAST INFECTION OF THE VAGINA: ICD-10-CM

## 2021-01-27 NOTE — ASSESSMENT & PLAN NOTE
Took ocp about 8 years ago.  She took seasonique 8 years ago. But due to migraine with intermittent aura I advised against ocp now. Sheagrees not wanting her risk for stroke to increase.  She wants to get an IUD.      Will schedule with Dr. Angeles for IUD placement.

## 2021-04-25 ENCOUNTER — HEALTH MAINTENANCE LETTER (OUTPATIENT)
Age: 36
End: 2021-04-25

## 2021-05-27 ENCOUNTER — RECORDS - HEALTHEAST (OUTPATIENT)
Dept: ADMINISTRATIVE | Facility: CLINIC | Age: 36
End: 2021-05-27

## 2021-05-28 ASSESSMENT — ANXIETY QUESTIONNAIRES: GAD7 TOTAL SCORE: 0

## 2021-05-29 ENCOUNTER — RECORDS - HEALTHEAST (OUTPATIENT)
Dept: ADMINISTRATIVE | Facility: CLINIC | Age: 36
End: 2021-05-29

## 2021-05-30 VITALS — BODY MASS INDEX: 30.39 KG/M2 | WEIGHT: 184 LBS

## 2021-05-30 NOTE — TELEPHONE ENCOUNTER
Refill Approved    Rx renewed per Medication Renewal Policy. Medication was last renewed on 8/7/2018.  Last OV 2/19/2019.    Clotilde Richard, [facility] Triage/Med Refill 7/30/2019     Requested Prescriptions   Pending Prescriptions Disp Refills     sertraline (ZOLOFT) 50 MG tablet [Pharmacy Med Name: SERTRALINE HCL 50 MG TABLET] 90 tablet 3     Sig: TAKE 1 TABLET BY MOUTH EVERY DAY       SSRI Refill Protocol  Passed - 7/30/2019  1:26 AM        Passed - PCP or prescribing provider visit in last year     Last office visit with prescriber/PCP: 2/19/2019 Christine Plaza MD OR same dept: 2/19/2019 Christine Plaza MD OR same specialty: 2/19/2019 Christine Plaza MD  Last physical: Visit date not found Last MTM visit: Visit date not found   Next visit within 3 mo: Visit date not found  Next physical within 3 mo: Visit date not found  Prescriber OR PCP: Christine Plaza MD  Last diagnosis associated with med order: There are no diagnoses linked to this encounter.  If protocol passes may refill for 12 months if within 3 months of last provider visit (or a total of 15 months).

## 2021-05-31 VITALS — WEIGHT: 180.3 LBS | HEIGHT: 66 IN | BODY MASS INDEX: 28.98 KG/M2

## 2021-05-31 VITALS — WEIGHT: 180 LBS | BODY MASS INDEX: 29.5 KG/M2

## 2021-05-31 VITALS — BODY MASS INDEX: 28.28 KG/M2 | WEIGHT: 176 LBS | HEIGHT: 66 IN

## 2021-05-31 VITALS — WEIGHT: 178.2 LBS | BODY MASS INDEX: 28.64 KG/M2 | HEIGHT: 66 IN

## 2021-05-31 VITALS — BODY MASS INDEX: 29.5 KG/M2 | WEIGHT: 180 LBS

## 2021-05-31 VITALS — WEIGHT: 179 LBS | BODY MASS INDEX: 29.33 KG/M2

## 2021-05-31 NOTE — PROGRESS NOTES
ASSESSMENT AND PLAN:      Problem List Items Addressed This Visit        Unprioritized    Class 2 obesity due to excess calories without serious comorbidity with body mass index (BMI) of 35.0 to 35.9 in adult     Discussed that her weight makes her feel stressed and she notes that despite her best efforts her weight is trending up. I suggested looking at the 24 week intensive lifestyle program. She liked this idea and will consider starting this.          Migraine without aura and without status migrainosus, not intractable     Start prn imitrex 100 mg po q day.  In the remote past she also used amitriptyline to help control her migraines, but she feels that is into necessary now. Also would like to lose weight because she feels her weight is stressful to her and stress triggers her migraines.          Relevant Medications    SUMAtriptan (IMITREX) 100 MG tablet      Other Visit Diagnoses     Migraine without status migrainosus, not intractable, unspecified migraine type    -  Primary    Relevant Medications    SUMAtriptan (IMITREX) 100 MG tablet           Chief Complaint   Patient presents with     Headache     in ER last night, used to have headaches when she was younger     Jessica Manuel is a 33 y.o. female is currently feeling well, but was in the ER for a really bad migraine 8/6/19. She says she has had migraines for years and in the remote past has taken imitrex 100 mg orally prn and amitryptyline q hs.  Today she thinks she would like to start using prn imitrex again.      Upon more questioning she says she used to have more frequent headaches before kids, then for the past 6 years she only got about 2 per year, but in the past few months she has noticed 1-2 per month. She feels her weight gain is stressful to her and stress is a trigger.     Her headaches are usually unilateral and she thinks on the right over the right eye, described as a pressure sensation with associated nausea,  vomiting sometimes, photophobia, sonophobia and sometimes she needs to just lay in a dark quiet room and she cannot do her daily activities until the headache relents and the headache can last 1-2 days at times.     Social History     Tobacco Use   Smoking Status Never Smoker   Smokeless Tobacco Never Used      Current Outpatient Medications on File Prior to Visit   Medication Sig Dispense Refill     azithromycin (ZITHROMAX) 250 MG tablet TAKE 2 TABLETS BY MOUTH ON DAY ONE, THEN 1 TAB PO QD ON DAYS 2-5. REPEAT IN 1 MONTH  0     buPROPion (WELLBUTRIN XL) 150 MG 24 hr tablet Take 1 tablet (150 mg total) by mouth every morning. 90 tablet 3     cholecalciferol, vitamin D3, 1,000 unit capsule Take 1 capsule by mouth daily.       magnesium 250 mg Tab Take 1 tablet by mouth daily.       metroNIDAZOLE (METROCREAM) 0.75 % cream APPLY TO FACE EVERY DAY  6     pantoprazole (PROTONIX) 40 MG tablet TAKE 1 TABLET (40 MG TOTAL) BY MOUTH DAILY. 90 tablet 2     predniSONE (DELTASONE) 10 mg tablet TAKE 4 TABS BY MOUTH EVERY AM X 5 DAYS ,2 TAB EVERY AM X 5 DAYS,1 TAB EVERY AM X 5 DAYS THEN STOP.  0     sertraline (ZOLOFT) 50 MG tablet TAKE 1 TABLET BY MOUTH EVERY DAY 90 tablet 2     No current facility-administered medications on file prior to visit.       Allergies   Allergen Reactions     Amoxicillin-Pot Clavulanate Nausea And Vomiting and Swelling     Ciprofloxacin      Potassium Clavulanate Other (See Comments)     Sulfamethoxazole-Trimethoprim          Review of Systems   Constitutional: Negative.    HENT: Negative.    Eyes: Negative.    Respiratory: Negative.    Cardiovascular: Negative.    Gastrointestinal: Negative.    Endocrine: Negative.    Genitourinary: Negative.    Musculoskeletal: Negative.    Skin: Negative.    Neurological: Negative.    Hematological: Negative.    Psychiatric/Behavioral: Negative.         OBJECTIVE: /82 (Patient Site: Left Arm, Patient Position: Sitting, Cuff Size: Adult Large)   Pulse (!) 104   " Ht 5' 5\" (1.651 m)   Wt 213 lb 4.8 oz (96.8 kg)   LMP 07/23/2019   SpO2 98%   Breastfeeding? No   BMI 35.49 kg/m     Physical Exam   Constitutional: She is oriented to person, place, and time. She appears well-developed and well-nourished. No distress.   HENT:   Head: Normocephalic and atraumatic.   Eyes: Conjunctivae are normal.   Neck: Neck supple.   Cardiovascular: Normal rate and regular rhythm.   Pulmonary/Chest: Effort normal.   Musculoskeletal: Normal range of motion.   Neurological: She is alert and oriented to person, place, and time.   Neuro exam is normal including gait, rapidly alternating movements, extraocular movements are intact, pupils are equally round and reactive to light and accommodation, there is no arm drift, she is able to stand on 1 foot at a time without losing balance, finger to nose is normal.  Coordination is normal.  Sensation is symmetrical bilaterally. facial symmetry is normal.  Normal Romberg exam.  Normal heel-to-shin.    Skin: Skin is warm and dry.   Psychiatric: She has a normal mood and affect.        Additional History from Old Records Summarized (2): yes weight loss graph reviewed.   Decision to Obtain Records (1): no  Radiology Tests Summarized or Ordered (1): no  Labs Reviewed or Ordered (1): yes  Medicine Test Summarized or Ordered (1): no  Independent Review of EKG or X-RAY(2 each): no    This note was created using Dragon dictation.  Please excuse any grammatical errors.   "

## 2021-05-31 NOTE — TELEPHONE ENCOUNTER
I have mainly seen this patient for weight loss.  Ntbs. Recommend walk in care or another provider as I do not have any availability this week.      Also when she feels better, she is due for annual exam with me and HM.

## 2021-05-31 NOTE — TELEPHONE ENCOUNTER
RN triage   Call from pt   Pt states seen in ED for migraine yesterday -- treated and sent home -- migraine had resolved at that time   Woke this AM w/ mild ' regular ' headache -- and now progress to mild migraine   Same place as yesterday's migraine -- front R -- eye and forehead   Also taking z deni for sinus issues-- prescribed by ENT -- no fever -- has clear nasal drainage   Reviewed home care advice  Pt states she has never seen PCP for migraines  Pt requesting advice -- what to take now for her returning migraine ?  Pt transferred to  for appt w/ PCP -- post ED and to establish ongoing migraine care   Please advise   Deborah Suh RN [facility] RN triage    Reason for Disposition    Mild-moderate headache    Protocols used: HEADACHE-A-OH

## 2021-06-01 VITALS — BODY MASS INDEX: 34.67 KG/M2 | HEIGHT: 65 IN | WEIGHT: 208.1 LBS

## 2021-06-01 VITALS — HEIGHT: 66 IN | BODY MASS INDEX: 32.66 KG/M2 | WEIGHT: 203.2 LBS

## 2021-06-01 VITALS — HEIGHT: 65 IN | WEIGHT: 206 LBS | BODY MASS INDEX: 34.32 KG/M2

## 2021-06-02 VITALS — HEIGHT: 65 IN | WEIGHT: 199 LBS | BODY MASS INDEX: 33.15 KG/M2

## 2021-06-02 VITALS — BODY MASS INDEX: 33.47 KG/M2 | WEIGHT: 200.9 LBS | HEIGHT: 65 IN

## 2021-06-02 VITALS — WEIGHT: 203 LBS | BODY MASS INDEX: 33.82 KG/M2 | HEIGHT: 65 IN

## 2021-06-02 VITALS — WEIGHT: 201 LBS | HEIGHT: 65 IN | BODY MASS INDEX: 33.49 KG/M2

## 2021-06-02 NOTE — TELEPHONE ENCOUNTER
Refill Approved    Rx renewed per Medication Renewal Policy. Medication was last renewed on 9/12/18.    Joanne Cheung, [facility] Triage/Med Refill 10/31/2019     Requested Prescriptions   Pending Prescriptions Disp Refills     buPROPion (WELLBUTRIN XL) 150 MG 24 hr tablet [Pharmacy Med Name: BUPROPION HCL  MG TABLET] 90 tablet 3     Sig: TAKE 1 TABLET BY MOUTH EVERY DAY IN THE MORNING       Tricyclics/Misc Antidepressant/Antianxiety Meds Refill Protocol Passed - 10/31/2019  4:24 AM        Passed - PCP or prescribing provider visit in last year     Last office visit with prescriber/PCP: 8/7/2019 Christine Plaza MD OR same dept: 8/7/2019 Christine Plaza MD OR same specialty: 8/7/2019 Christine Plaza MD  Last physical: Visit date not found Last MTM visit: Visit date not found   Next visit within 3 mo: Visit date not found  Next physical within 3 mo: Visit date not found  Prescriber OR PCP: Christine Plaza MD  Last diagnosis associated with med order: There are no diagnoses linked to this encounter.  If protocol passes may refill for 12 months if within 3 months of last provider visit (or a total of 15 months).

## 2021-06-03 VITALS — HEIGHT: 65 IN | BODY MASS INDEX: 35.54 KG/M2 | WEIGHT: 213.3 LBS

## 2021-06-04 VITALS — BODY MASS INDEX: 33.02 KG/M2 | WEIGHT: 198.2 LBS | HEIGHT: 65 IN

## 2021-06-04 VITALS — WEIGHT: 200 LBS | HEIGHT: 65 IN | BODY MASS INDEX: 33.32 KG/M2

## 2021-06-04 NOTE — TELEPHONE ENCOUNTER
"Medication Question or Clarification  Who is calling: Pharmacy: [facility]  What medication are you calling about? (include dose and sig)    Disp Refills Start End    SUMAtriptan (IMITREX) 100 MG tablet 9 tablet 6 12/23/2019     Sig - Route: Take 1 tablet (100 mg total) by mouth once as needed for migraine. - Oral    Sent to pharmacy as: SUMAtriptan 100 mg tablet (IMITREX)    E-Prescribing Status: Receipt confirmed by pharmacy (12/23/2019  2:24 PM CST)        Who prescribed the medication?: Christine Plaza MD   What is your question/concern?: Caller stated if the Sig can have something that says \" 1 times daily or 2 times daily etc. For billing purposes   Pharmacy: [facility] to leave a detailed message?: Yes  [phone] opt #3   Site CMT - Please call the pharmacy to obtain any additional needed information.  "

## 2021-06-04 NOTE — TELEPHONE ENCOUNTER
Received a fax that patient is changing to [facility] pharmacy.    Set up to authorize for new pharmacy    Ella Powell LPN

## 2021-06-04 NOTE — TELEPHONE ENCOUNTER
lvm at blood and clotting disorders clinic at [facility] as they see for this dx, wanting to see if this would be appropriate referral to them w o dx.

## 2021-06-05 VITALS
HEART RATE: 107 BPM | DIASTOLIC BLOOD PRESSURE: 70 MMHG | BODY MASS INDEX: 33.49 KG/M2 | SYSTOLIC BLOOD PRESSURE: 122 MMHG | OXYGEN SATURATION: 98 % | WEIGHT: 201 LBS | HEIGHT: 65 IN

## 2021-06-05 VITALS
RESPIRATION RATE: 16 BRPM | BODY MASS INDEX: 33.27 KG/M2 | HEIGHT: 66 IN | DIASTOLIC BLOOD PRESSURE: 70 MMHG | SYSTOLIC BLOOD PRESSURE: 106 MMHG | WEIGHT: 207 LBS | HEART RATE: 88 BPM

## 2021-06-05 VITALS
SYSTOLIC BLOOD PRESSURE: 118 MMHG | HEART RATE: 94 BPM | DIASTOLIC BLOOD PRESSURE: 70 MMHG | BODY MASS INDEX: 34.11 KG/M2 | OXYGEN SATURATION: 97 % | WEIGHT: 205 LBS

## 2021-06-05 NOTE — TELEPHONE ENCOUNTER
Hi Dr Plaza,  [facility] refused to schedule an appointment for her re: possible  Mane [place]. They stated they don't feel that they have anything to offer her.  Today I received a voice mail back from Delmi in the center for bleeding and clotting disorders clinic at the [facility], after leaving several voice mails for them.  They stated that they would be willing to see her.   If you are ok with this, would you be willing to place a new order for her for the [facility]. I am afraid if I send over the rheumatology order they will redirect it and her information will get lost.  Thank you!

## 2021-06-06 NOTE — TELEPHONE ENCOUNTER
Called [facility] to ask again why they wont do the testng. They stated they called pt Friday and asked for her to get the results form her family testing(pt thought she could get results) and then they will discuss it further. Pt has yet to call them back and give them an update re records. They state the concern for pt to have this syndrome is low, she has no active bleeding herself. They had sent a message to the surgeons office saying doubt it needed to be done before surgery.  I left a message with pt saying she needs to call them back with other questions since we don't won't read the test results and they have to order it.

## 2021-06-06 NOTE — TELEPHONE ENCOUNTER
[facility] Bleeding & Clot[facility] Disorders clinic - [phone] and she saw Dr.Yvonne Hayder Yao.     I called over there to see why they wouldn't order the test for her.     Spoke with a nurse Adelaida regarding this.   There's only a test for a very rare subtype for that vascular disorder. There's a small chance insurance won't cover the test without proof from a family member.      is not in the office today, will be back next week. Adelaida is going to speak with  about this on Monday regarding surgical situation.  or Adelaida is going to get in contact with  next week regarding this.     Lorie Hernandez, JORGE 2/21/2020 12:39 PM   [facility]

## 2021-06-06 NOTE — TELEPHONE ENCOUNTER
"We received a fax from an [facility] Surgeon's office - Dr.Eric Plata and his RN, Lisa.     The fax states this:   \" Your patient, Mary Manuel, 1985, recently saw Dr.Eric Plata for a general surgery reflux consult on 1/8/20 (consult note included).  Patient was then sent for follow up manometry testing at [facility] and told that given her esophageal motility, she was not a candidate for a LINX anti-reflux procedure, but instead would need to have a Toupet Fundoplication surgery.  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 Carlton Plata asked me to reach out to the patient's primary care provider regarding the genetic testing.  As part of the patient's pre-op examination, he was hoping you could address the patient's risk/need for testing of Mane-Danlos given her personal/family hx.  If you determine patient should be testing,  would like to delay surgery until testing complete and our office can review results.  If you determine testing is not necessary, we are happy to get patient scheduled for surgery as soon as she is ready.     If your nursing staff is able to call the patient and also our office with an update on the plan, it would be much appreciated.  PRIYANKA Gonzalez  [phone] phone  [phone] fax, Attn: PRIYANKA Gonzalez\"          The patient's note to them was as follows, sent 2/18:   \"Elias Gonzalez,  I did try to leave you a phone message last week but I'm not sure if it reached you. I am interested in the alternative procedure, so I'd like to review the materials when I get them and get back to you.     I've recently had another potential roadblock to surgery arise that I wanted to get your/'s opinion on.  There are several members of my family, including my mother, who believe that they have the vascular type of Mane-Danlos syndrome but do not have a " "confirmed diagnosis.  I had an appt with the [facility] bleeding & clotting clinic to request genetic testing to ensure I don't have this condition before surgery, but they will not order the testing unless I have a verified test from a family member which I do not have.    Is your office able to order the test?  I am hesitant to get an elective surgery if something could go wrong if I have this genetic disorder.  Do you have any thoughts on this?  Thank you so much for your help.   Mary\"        Please advise how you want to handle this.  Do you want to see Mary for an appointment to discuss in person?  Do we need to call the [facility] office to see why they won't do the test for her without a family members results?     Lorie Hernandez CMA 2/19/2020 5:06 PM   [facility]    "

## 2021-06-06 NOTE — TELEPHONE ENCOUNTER
We need to call [facility] to see why they wont do the testing for the patient. I have already ordered this per patient request and not sure why they are reluctant. But maybe they have a good reason?

## 2021-06-06 NOTE — TELEPHONE ENCOUNTER
Left a message for Lisa at the surgeon's office explaining the situation.  I told her in the voicemail that we'll get back to her early next week once we know more from the genetic doctor.       Lorie Hernandez, [facility] 2/21/2020 3:41 PM   [facility]

## 2021-06-06 NOTE — TELEPHONE ENCOUNTER
Lorie, please call surgery office and explain, if they want me to speak to them I will but due to the busy schedule, I may need surgeons cell phone or pager because by the time I am able to call it will likely be after hours.

## 2021-06-09 NOTE — TELEPHONE ENCOUNTER
Patient wants to pay out of pocket per out conversation today. lmk if she changed her mind on this, she had planned to use the manufacturers coupon to get it for $100/ month

## 2021-06-09 NOTE — TELEPHONE ENCOUNTER
No answer/left message for Mary to assist in getting scheduled for her 1st health & wellness coaching visit. She is interested in purchasing a 3 pack of health & wellness coaching.    Left message and the main scheduling number for her to call back:  [phone] to request an appointment.    JAZMIN Jara, [facility]  National Board Certified Health &   24 Week Healthy Lifestyle Program Health   [facility]  [phone]  email:  [email or web address]

## 2021-06-09 NOTE — TELEPHONE ENCOUNTER
Received fax from [employer].    Qsymia is not covered.   Preferred alternatives:  Diethylpropion, Benzphetamine, or Phentermine    Or to do a PA:   [phone]  ID: [id number]  Group: [id number] Hernandez CMA 7/24/2020 5:01 PM   [facility]

## 2021-06-09 NOTE — TELEPHONE ENCOUNTER
Refill Approved    Rx renewed per Medication Renewal Policy. Medication was last renewed on 12/23/19.    Tiera Reeder, [facility] Triage/Med Refill 7/24/2020     Requested Prescriptions   Pending Prescriptions Disp Refills     sertraline (ZOLOFT) 50 MG tablet [Pharmacy Med Name: SERTRALINE HCL 50 MG TABLET] 90 tablet 0     Sig: TAKE 1 TABLET BY MOUTH EVERY DAY       SSRI Refill Protocol  Passed - 7/22/2020 12:23 AM        Passed - PCP or prescribing provider visit in last year     Last office visit with prescriber/PCP: 8/7/2019 Christine Plaza MD OR same dept: Visit date not found OR same specialty: 8/7/2019 Christine Plaza MD  Last physical: Visit date not found Last MTM visit: Visit date not found   Next visit within 3 mo: Visit date not found  Next physical within 3 mo: Visit date not found  Prescriber OR PCP: Christine Plaza MD  Last diagnosis associated with med order: 1. Mixed obsessional thoughts and acts  - sertraline (ZOLOFT) 50 MG tablet [Pharmacy Med Name: SERTRALINE HCL 50 MG TABLET]; TAKE 1 TABLET BY MOUTH EVERY DAY  Dispense: 90 tablet; Refill: 0    If protocol passes may refill for 12 months if within 3 months of last provider visit (or a total of 15 months).

## 2021-06-09 NOTE — PROGRESS NOTES
"Mary Manuel is a 34 y.o. female who is being evaluated via a billable video visit.      The patient has been notified of following:     \"This video visit will be conducted via a call between you and your physician/provider. We have found that certain health care needs can be provided without the need for an in-person physical exam.  This service lets us provide the care you need with a video conversation.  If a prescription is necessary we can send it directly to your pharmacy.  If lab work is needed we can place an order for that and you can then stop by our lab to have the test done at a later time.    Video visits are billed at different rates depending on your insurance coverage. Please reach out to your insurance provider with any questions.    If during the course of the call the physician/provider feels a video visit is not appropriate, you will not be charged for this service.\"    Patient has given verbal consent to a Video visit? Yes  How would you like to obtain your AVS? AVS Preference: [employer].  If dropped by the video visit, the video invitation should be sent to:Will join via [employer]  Will anyone else be joining your video visit? No      Video Start Time: 12:19 PM      Video-Visit Details    Type of service:  Video Visit    Video End Time (time video stopped): 12:52 PM  Originating Location (pt. Location): Home    Distant Location (provider location):  [facility]     Platform used for Video Visit: Domenic Plaza MD         I spent 33 minutes today in direct patient contact, 100% of the time in video consultation concerning medical problems as listed below.    New Medical Weight Management Consult    PATIENT:  Mary Manuel  MRN:         [id number]  :         1985  CHRISTOPH:         2020       Full intake, restart assessment was done to determine barriers to weight loss success and develop a treatment plan. Mary Manuel is a 34 y.o. " "female interested in treatment of medical problems associated with weight.     Vitals:    07/24/20 1215   Weight: 198 lb 3.2 oz (89.9 kg)   Height: 5' 5\" (1.651 m)     Body mass index is 32.98 kg/m .      ASSESSMENT:    Plateau - wants to restart active weight loss/  WEIGHT MAINTENANCE PHASE (BMI DOWN FROM 34 TO 32)  Dx: BMI 34.63, in the setting of multiple weight related comorbid conditions including: Chronic reflux esophagitis, severe binge eating disorder, and vitamin D deficiency    Restarting weight loss  Dc wellbutrin - wants to dc and try phentermine again.  qsymia - start lowest dose    Labs for baseline as she restarts weight loss.   Info on smart scale sent.     Follow up in 1 month with me  Follow up asap with dietician  Follow up with 3 pack health coaching asap (wants to try this before considering 24 week plan).      Initial Weight: 208.1 lbs bmi 34.63, 7/17/2018 (restart)  Weight: 208 lb 1.6 oz 7/17/2018   Weight: 206 lb (93.4 kg) bmi 34.28 8/15/2018   Weight: 203 lb (92.1 kg) bmi 33.78 9/12/2018   Weight: 200 lb 14.4 oz (91.1 kg) bmi 33.43, 11/29/2018   Weight: 201 lb (91.2 kg) bmi 33.45, 1/16/2019   Weight: 199 lb (90.3 kg) bmi 33.12, 2/19/2019   Weight: 198 lb 3.2 oz (89.9 kg), BMI, 32, 7/24/2020   Weight loss from initial: 9.9  % Weight loss: 4.76 %     LEXAPRO 20 mg orally per day -anxiety and obsessive-compulsive disorder  7/2017-7/2018-weight gain of 30 pounds  Dc 7/17/2018       ZOLOFT 50 mg orally per day-anxiety and OCD  7/17/2018 - 2/19/2019    - doing well feels this is working. - will continue     WELLBUTRIN 150 XL DAILY   8/15/2018 - 7/24/2020  -  DC WELLBUTRIN, WANTS TO RESTART phentermine.       PHENTERMINE 37.5 MG PO Q DAY - For weight loss  4/19/2016-9/2016-initially lost 15 pounds but then even though she was still taking the phentermine she gained 7 pounds.  9/2016-12/2016 she did not take phentermine and gained 10 pounds during this time  1/2017 -4/2017 -she did not take " phentermine and lost 15 pounds  4/2017 - 11/2017 -weight was stable and went up and down 5 pounds despite the use of phentermine during this time  11/2017 -7/2018-gained 30 pounds probably due to use of Lexapro during this time  7/2018-we will consider use of phentermine again in the future but at this time I would like to start Zoloft and discontinue Lexapro to see how she does once she stabilized on the Zoloft we can consider adding or changing medications.    QSYMIA  7/24/2020 - START LOWEST DOSE.      saxenda   1/16/2019 -she did look into this and thought about this at length.  She decided not to do it because of her thyroid abnormality and she is worried about potential for thyroid medullary carcinoma with this drug.        Weaknesses:   -She does exercise regularly.  -Stress  -Genetics  - Depression  -works full time - desk job  - craves sweets, candy, chocolate, chips, crackers    Strengths:   -She is a been able to maintain the weight loss that she achieved back in 2018, now July 2020 she wants to restart weight loss and lose more  - she does the food shopping  - family supportive of her health goals  - likes veggies  -drinks water    She has the following co-morbidities:    [facility] CO-MORBIDITIES 7/20/2020   How has your weight changed over the last year?  Lost   How many pounds? 5-10   I have the following health issues associated with obesity: GERD (Reflux)   I have the following symptoms associated with obesity: Depression       Patient goals reviewed with patient 7/20/2020   I am interested in having a healthier weight to diminish current health problems: Yes   I am interested in having a healthier weight in order to prevent future health problems: Yes   I am interested in having a healthier weight in order to have a future surgery: No   I have the following family history of obesity/being overweight:  My mother is overweight, My father is overweight, One or more of my siblings are overweight, Many  "of my relatives are overweight   I have the following family history of obesity/being overweight:  My mother is overweight, My father is overweight, One or more of my siblings are overweight, Many of my relatives are overweight       Referring Provider 7/20/2020   Please name the provider who referred you to Medical Weight Management.  If you do not know, please answer: \"I Don't Know\". previous patient        Weight History Reviewed With Patient 7/20/2020   How concerned are you about your weight? Very Concerned   Would you describe your weight gain as gradual? No   I became overweight: After Pregnancy   The following factors have contributed to my weight gain:  Starting on Medication that Caused Weight Gain, Genetic (Runs in the Family), Stress   Please list the medications you have tried. phentermine   My lowest weight since age 16 was: 125   My highest weight since age 16 was: 215   The most weight I have ever lost was: (lbs) 25       Diet Recall Reviewed With Patient 7/20/2020   Do you typically eat breakfast? No   Do you typically eat lunch? Yes   If you do eat lunch, what types of food do you typically eat?  salad, leftovers, fruit, crackers w/ cheese   Do you typically eat supper? Yes   If you do eat supper, what types of food do you typically eat? turkey burger, turkey tacos, chicken breast w/ veg & rice, grilled fish or chicken   Do you typically eat snacks? Yes   If you do snack, what types of food do you typically eat? I only snack after my kids go to bed which is what I believe is causing my weight plateau issue   Do you like vegetables?  Yes   Do you drink water?  Yes   How many glasses of juice do you drink in a typical day? 0   How many of glasses of milk do you drink in a typical day? 0   How many 8oz glasses of sugar containing drinks such as Parag-Aid/sweet tea do you drink in a day? 0   How many cans/bottles of sugar pop/soda/tea/sports drinks do you drink in a day? 0   How many cans/bottles of " sugar pop/soda/tea/sports drinks do you drink in a day? 0   How often do you have a drink of alcohol? 2-4 Times a Month   If you do drink, how many drinks might you have in a day? 1 or 2       Eating Habits Reviewed With Patient 7/20/2020   Eats Starches Once a Week   Generally, my meals include foods like these: fried meats, brats, burgers, french fries, pizza, cheese, chips, or ice cream. Once a Week   Eat fast food (like [employer], [employer], [employer]aco Bell). Less Than Weekly   Buffet Less Than Weekly   Watches TV Never   Often skip meals, eat at random times, have no regular eating times. Never   Grazes Less Than Weekly   Eat extra snacks between meals. Less Than Weekly   Eat most of my food at the end of the day. A Few Times a Week   Eats at Night A Few Times a Week   Eat extra snacks to prevent or correct low blood sugar. Never   Eat to prevent acid reflux or stomach pain. Less Than Weekly   Worry about not having enough food to eat. Never   Have you been to the food shelf at least a few times this year? No   I eat when I am depressed. Less Than Weekly   I eat when I am stressed. A Few Times a Week   I eat when I am bored. Never   I eat when I am anxious. A Few Times a Week   I eat when I am happy or as a reward. Less Than Weekly   I feel hungry all the time even if I just have eaten. Never   Feeling full is important to me. Less Than Weekly   I finish all the food on my plate even if I am already full. Never   I can't resist eating delicious food or walk past the good food/smell. Once a Week   I eat/snack without noticing that I am eating. Less Than Weekly   I eat when I am preparing the meal. Never   I eat more than usual when I see others eating. Never   I have trouble not eating sweets, ice cream, cookies, or chips if they are around the house. Less Than Weekly   I think about food all day. Less Than Weekly   What foods, if any, do you crave? Sweets / Candy / Chocolate, Chips / Crackers        Activity/Exercise History Reviewed With Patient 7/20/2020   How much of a typical 12 hour day do you spend sitting? Half the Day   How much of a typical 12 hour day do you spend lying down? Less Than Half the Day   How much of a typical day do you spend walking/standing? Less Than Half the Day   How many hours (not including work) do you spend on the TV/Video Games/Computer/Tablet/Phone? 1 Hour or Less   How many times a week are you active for the purpose of exercise? 4-5 Times a Week   How many total minutes do you spend doing some activity for the purpose of exercising when you exercise? More Than 30 Minutes       PAST MEDICAL HISTORY:  Past Medical History:   Diagnosis Date     Joint Pain, Localized In The Left Shoulder     Created by Conversion        Work/Social History Reviewed With Patient 7/20/2020   My employment status is: Full-Time   My job is: Marketing   How much of your job is spent on the computer or phone? 75%   How many hours do you spend commuting to work daily?  0   What is your marital status?  / In a Relationship   If in a relationship, is your significant other overweight? No   Do you have children? Yes   If you have children, are they overweight? No   Who do you live with?  , son (age 7), son (age 5)   Are they supportive of your health goals? Yes   Who does the food shopping?  Myself       Mental Health History Reviewed With Patient 7/20/2020   Have you ever been physically or sexually abused? No   If yes, do you feel that the abuse is affecting your weight? N/A   If yes, would you like to talk to a counselor about the abuse? N/A   How often in the past 2 weeks have you felt little interest or pleasure in doing things? Not at all   Over the past 2 weeks how often have you felt down, depressed, or hopeless? Not at all       Sleep History Reviewed With Patient 7/20/2020   How many hours do you sleep at night? 6   Do you think that you snore loudly or has anybody ever heard  you snore loudly (louder than talking or so loud it can be heard behind a shut door)? No   Has anyone seen or heard you stop breathing during your sleep? No   Do you often feel tired, fatigued, or sleepy during the day? No   Do you have a TV/Computer or other screens in your bedroom? No       MEDICATIONS:   Current Outpatient Medications   Medication Sig Dispense Refill     buPROPion (WELLBUTRIN XL) 150 MG 24 hr tablet TAKE 1 TABLET BY MOUTH EVERY DAY IN THE MORNING 90 tablet 1     cetirizine (ZYRTEC) 10 MG tablet Take 1 tablet by mouth.       cholecalciferol, vitamin D3, 1,000 unit capsule Take 1 capsule by mouth daily.       magnesium 250 mg Tab Take 1 tablet by mouth daily.       metroNIDAZOLE (METROCREAM) 0.75 % cream APPLY TO FACE EVERY DAY  6     pantoprazole (PROTONIX) 40 MG tablet TAKE 1 TABLET (40 MG TOTAL) BY MOUTH DAILY. 90 tablet 1     SUMAtriptan (IMITREX) 100 MG tablet Take 1 tablet (100 mg total) by mouth once as needed for migraine (once daily as needed). 9 tablet 6     No current facility-administered medications for this visit.        ALLERGIES:   Allergies   Allergen Reactions     Amoxicillin-Pot Clavulanate Nausea And Vomiting, Swelling and Angioedema     Other reaction(s): Throat Swelling/Closing     Ciprofloxacin      Potassium Clavulanate Other (See Comments)     Sulfamethoxazole-Trimethoprim        PHYSICAL EXAM:  Physical Exam  Constitutional:       General: She is not in acute distress.     Appearance: She is well-developed.   HENT:      Head: Normocephalic and atraumatic.   Eyes:      Conjunctiva/sclera: Conjunctivae normal.   Neck:      Musculoskeletal: Neck supple.   Pulmonary:      Effort: Pulmonary effort is normal.   Musculoskeletal: Normal range of motion.   Neurological:      Mental Status: She is alert and oriented to person, place, and time.            Sincerely,    Christine Plaza MD

## 2021-06-10 NOTE — PROGRESS NOTES
PLAN    BECAUSE OF GERD PROBLEMS IT IS STRONGLY ADVISED THAT Mary LOSE WEIGHT.  BELOW IS MY PLAN FOR her       Assessment:   I spent 20 minutes with the patient. 100 % of that time was spent face to face - discussion was longer today due to the fact that she has not been in to seem me since 8/2016.    SUMMARY  DIAGNOSIS - GERD - WITH ELEVATED BMI. TX WITH WEIGHT REDUCTION.     Start date 4/19/2016 start weight 192, now at 180 5/17/2017  Goal weight 173-182  Ideal body weight: 57.6 kg (126 lb 14.9 oz)    Prescribed medicaitons; phentermine (condoms for contraception).  She said she's been out of her phentermine. She wants to try it again and promises she will return to clinic consistently monthly.  Supplements; fish oil, vitamin D 5000 IU (Recheck in August 2016 or so), mvi, chormium.  Follow up monthly.    Goal this month: wants to go to yoga more.  Currently going 1-2 times a week, and will shoot for consistently going twice a week, and try to add a protein shake per day.   Future discussion topic: Call wade program to look into possible compulsive eating.    Barriers to losing weight:? Compulsive eating    Recommended macronutrients;   Calories: 1470 daily  Protein 110 grams per day  carbs 50-75 grams per day     GOITER NODULAR -   Due for follow up ultrasound. Ordered today.     BP MILDLY ELEVATED (at first visit).   Normalized.  Will monitor closely in setting of phentermine and weight loss.         SUBJECTIVE: Mary Manuel is a 31 y.o. female  comes in for GERD and weight reduction. 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  She  has been doing [employer].      Since starting the weight loss program she lost weight, gained it back and then lost it again.  We will restart her phentermine at the last visit and we discussed the importance of weaning off gradually in order to avoid weight regain.      Are you experiencing any side effects to the medications:   "No  Hunger control:  good  Exercise was discussed: yes - she is doing yoga 1-2 times a week.  Taking supplements:  yes  Discussed journaling food: doing [employer].  (80 g protein and 1200 calories) - disucssed adding 30 g protein shake and 200 calories at least.   Patient is pleased with the current results:  She lost then gained and is now losing again.  The patient is following the nutrition plan: not sure about carbs and protein.  Barriers to losing weight: it is time consuming to track.  And she doesn't always track when she eats poorly or for emotional reasons.    ROS  A comprehensive review of systems was negative.  Pertinent items are noted in HPI.    EXAM  Initial Weight: 192 lbs  Weight: 180 lb 4.8 oz (81.8 kg)  Weight loss from initial: 11.7  % Weight loss: 6.09 %  Body Fat %: 37.7  Waist Circumference: 36.5 inches       /70  Ht 5' 5.5\" (1.664 m)  Wt 180 lb 4.8 oz (81.8 kg)  BMI 29.55 kg/m2   Physical Exam   Constitutional: She is oriented to person, place, and time. She appears well-developed and well-nourished.   HENT:   Head: Normocephalic and atraumatic.   Eyes: Conjunctivae are normal.   Cardiovascular: Normal rate and regular rhythm.    Pulmonary/Chest: Effort normal.   Neurological: She is alert and oriented to person, place, and time.   Skin: Skin is warm and dry.   Psychiatric: She has a normal mood and affect.        "

## 2021-06-10 NOTE — PROGRESS NOTES
PLAN    BECAUSE OF GERD PROBLEMS IT IS STRONGLY ADVISED THAT Mary LOSE WEIGHT.  BELOW IS MY PLAN FOR her       Assessment:   I spent 25 minutes with the patient. 100 % of that time was spent face to face - discussion was longer today due to the fact that she has not been in to seem me since 8/2016.    SUMMARY  DIAGNOSIS - GERD - WITH ELEVATED BMI. TX WITH WEIGHT REDUCTION.     Start date 4/19/2016 start weight 192, now at 184   Goal weight 173-182  Ideal body weight: 57.6 kg (126 lb 14.9 oz)    Prescribed medicaitons; phentermine (condoms for contraception).  She said she's been out of her phentermine. She wants to try it again and promises she will return to clinic consistently monthly.  Supplements; fish oil, vitamin D 5000 IU (Recheck in August 2016 or so), mvi, chormium.  Follow up monthly.    Goal this month: start phentermine again. INBODY AND LABS at follow up.  Future discussion topic: Call wade program to look into possible compulsive eating.    Barriers to losing weight:? Compulsive eating      GOITER NODULAR -   Due for follow up ultrasound. Ordered today.     BP MILDLY ELEVATED (at first visit).   Normalized.  Will monitor closely in setting of phentermine and weight loss.         SUBJECTIVE: Mary Manuel is a 31 y.o. female  comes in for GERD and weight reduction. 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  She  has been doing [employer] meals.      She actually regained the weight she lost and is now back as she is ready to lose again (see her graph - she just recently re- lost the weight she gained back).    Are you experiencing any side effects to the medications:  No  Hunger control:  good  Exercise was discussed: yes  Taking supplements:  yes  Discussed journaling food: doing [employer].   Patient is pleased with the current results:  She lost then gained and is now losing again.  The patient is following the nutrition plan: not sure about carbs and  protein.  Barriers to losing weight: it is time consuming to track.  And she doesn't always track when she eats poorly or for emotional reasons.    ROS  A comprehensive review of systems was negative.  Pertinent items are noted in HPI.    EXAM  Initial Weight: 192 lbs  Weight: 184 lb (83.5 kg)  Weight loss from initial: 8  % Weight loss: 4.17 %       /70  Pulse 76  Resp 14  Wt 184 lb (83.5 kg)  Breastfeeding? No  BMI 30.39 kg/m2    Physical Exam   Constitutional: She is oriented to person, place, and time. She appears well-developed and well-nourished.   HENT:   Head: Normocephalic and atraumatic.   Eyes: Conjunctivae are normal.   Cardiovascular: Normal rate and regular rhythm.    Pulmonary/Chest: Effort normal.   Neurological: She is alert and oriented to person, place, and time.   Skin: Skin is warm and dry.   Psychiatric: She has a normal mood and affect.

## 2021-06-11 NOTE — TELEPHONE ENCOUNTER
Prior Authorization Request  Who s requesting:  Pharmacy  Pharmacy Name and Location: [place] [id number]  Medication Name: phentermine (LOMAIRA) 8 mg Tab   Insurance Plan: [employer]  Insurance Member ID Number:  [id number]  CoverMyMeds Key: n/a  Informed patient that prior authorizations can take up to 10 business days for response:   NA  Okay to leave a detailed message: Yes

## 2021-06-11 NOTE — PROGRESS NOTES
"Mary Manuel is a 34 y.o. female who is being evaluated via a billable video visit.      The patient has been notified of following:     \"This video visit will be conducted via a call between you and your physician/provider. We have found that certain health care needs can be provided without the need for an in-person physical exam.  This service lets us provide the care you need with a video conversation.  If a prescription is necessary we can send it directly to your pharmacy.  If lab work is needed we can place an order for that and you can then stop by our lab to have the test done at a later time.    Video visits are billed at different rates depending on your insurance coverage. Please reach out to your insurance provider with any questions.    If during the course of the call the physician/provider feels a video visit is not appropriate, you will not be charged for this service.\"    Patient has given verbal consent to a Video visit? Yes  How would you like to obtain your AVS? AVS Preference: MyChart.  If dropped by the video visit, the video invitation should be sent to: Text to cell phone: [phone]   Will anyone else be joining your video visit? No        Video Start Time: 7:44 AM    Video-Visit Details    Type of service:  Video Visit    Video End Time (time video stopped): 8:00am  Originating Location (pt. Location): Home    Distant Location (provider location):  [facility] MEDICINE/OB     Platform used for Video Visit: Domenic Plaza MD     ASSESSMENT AND PLAN:     Problem List Items Addressed This Visit        Unprioritized    Class 2 obesity due to excess calories without serious comorbidity with body mass index (BMI) of 35.0 to 35.9 in adult - Primary     [place] - wants to restart active weight loss/  WEIGHT MAINTENANCE PHASE (BMI DOWN FROM 34 TO 33)  Dx: BMI 34.63, in the setting of multiple weight related comorbid conditions including: Chronic reflux esophagitis, " severe binge eating disorder, and vitamin D deficiency     Restarting weight loss  Dc wellbutrin - wants to dc and try phentermine again.  Dc qsymia, didn't work, thinks caused gerd wants phentermine again. Will try lomira 8mg po tid     Annual exam and Labs at that time for baseline as she restarts weight loss.   Info on smart scale sent.      Follow up in 1 month with me  Follow up asap with dietician  Follow up with 3 pack health coaching asap (wants to try this before considering 24 week plan).      Initial Weight: 208.1 lbs bmi 34.63, 7/17/2018 (restart)  Weight: 208 lb 1.6 oz 7/17/2018   Weight: 206 lb (93.4 kg) bmi 34.28 8/15/2018   Weight: 203 lb (92.1 kg) bmi 33.78 9/12/2018   Weight: 200 lb 14.4 oz (91.1 kg) bmi 33.43, 11/29/2018   Weight: 201 lb (91.2 kg) bmi 33.45, 1/16/2019   Weight: 199 lb (90.3 kg) bmi 33.12, 2/19/2019   Weight: 198 lb 3.2 oz (89.9 kg), BMI, 32, 7/24/2020   Weight: 200 lb (90.7 kg), BMI 33.  Weight loss from initial: 8.1  % Weight loss: 3.89 %     LEXAPRO 20 mg orally per day -anxiety and obsessive-compulsive disorder  7/2017-7/2018-weight gain of 30 pounds  Dc 7/17/2018       ZOLOFT 50 mg orally per day-anxiety and OCD  7/17/2018 - 2/19/2019    - doing well feels this is working. - will continue     WELLBUTRIN 150 XL DAILY   8/15/2018 - 7/24/2020  -  DC WELLBUTRIN, WANTS TO RESTART phentermine.       PHENTERMINE  For weight loss  4/19/2016-9/2016-initially lost 15 pounds but then even though she was still taking the phentermine she gained 7 pounds.  9/2016-12/2016 she did not take phentermine and gained 10 pounds during this time  1/2017 -4/2017 -she did not take phentermine and lost 15 pounds  4/2017 - 11/2017 -weight was stable and went up and down 5 pounds despite the use of phentermine during this time  11/2017 -7/2018-gained 30 pounds probably due to use of Lexapro during this time  7/2018-we will consider use of phentermine again in the future but at this time I would like to  start Zoloft and discontinue Lexapro to see how she does once she stabilized on the Zoloft we can consider adding or changing medications.  9/1/2020 - RESTART 8MG PO TID     QSYMIA  7/24/2020 - 9/1/2020 caused GERD. ? topamax component due to phentermine being tolerated in the past     saxenda   1/16/2019 -she did look into this and thought about this at length.  She decided not to do it because of her thyroid abnormality and she is worried about potential for thyroid medullary carcinoma with this drug.        Weaknesses:   -She has not been exercising lately due to gerd 9/1/2020   -Stress  -Genetics  - Depression  -works full time - desk job  - craves sweets, candy, chocolate, chips, crackers     Strengths:   -She is a been able to maintain the weight loss that she achieved back in 2018, now July 2020 she wants to restart weight loss and lose more  - she does the food shopping  - family supportive of her health goals  - likes veggies  -drinks water         Relevant Medications    phentermine (LOMAIRA) 8 mg Tab           Chief Complaint   Patient presents with     Weight Loss        HPI  Mary Manuel is a 34 y.o. female comes in today to follow-up on her weight loss.  She says she does not feel like things have gone well because she has had really bad GERD.  It is been so bad that she has not been able to exercise.  She says that she feels like the phentermine worked for her but the Qsymia is not working for her.  She wonders if the Topamax is causing heartburn.  She prefers to discontinue Qsymia and start phentermine alone.    Social History     Tobacco Use   Smoking Status Never Smoker   Smokeless Tobacco Never Used      Current Outpatient Medications on File Prior to Visit   Medication Sig Dispense Refill     cetirizine (ZYRTEC) 10 MG tablet Take 1 tablet by mouth.       cholecalciferol, vitamin D3, 1,000 unit capsule Take 1 capsule by mouth daily.       magnesium 250 mg Tab Take 1 tablet by mouth daily.    "    metroNIDAZOLE (METROCREAM) 0.75 % cream APPLY TO FACE EVERY DAY  6     pantoprazole (PROTONIX) 40 MG tablet TAKE 1 TABLET (40 MG TOTAL) BY MOUTH DAILY. 90 tablet 1     phentermine-topiramate (QSYMIA) 3.75-23 mg 24 hour capsule Take 1 capsule by mouth daily. 30 capsule 0     sertraline (ZOLOFT) 50 MG tablet Take 50 mg by mouth daily.       SUMAtriptan (IMITREX) 100 MG tablet Take 1 tablet (100 mg total) by mouth once as needed for migraine (once daily as needed). 9 tablet 6     [DISCONTINUED] buPROPion (WELLBUTRIN XL) 150 MG 24 hr tablet TAKE 1 TABLET BY MOUTH EVERY DAY IN THE MORNING 90 tablet 1     [DISCONTINUED] phentermine-topiramate (QSYMIA) 3.75-23 mg 24 hour capsule Take 1 capsule by mouth daily. 30 capsule 0     No current facility-administered medications on file prior to visit.       Allergies   Allergen Reactions     Amoxicillin-Pot Clavulanate Nausea And Vomiting, Swelling and Angioedema     Other reaction(s): Throat Swelling/Closing     Betamethasone Swelling     Ciprofloxacin      Potassium Clavulanate Other (See Comments)     Sulfamethoxazole-Trimethoprim        Review of Systems   Constitutional: Negative.    HENT: Negative.    Eyes: Negative.    Respiratory: Negative.    Cardiovascular: Negative.    Gastrointestinal: Negative.    Endocrine: Negative.    Genitourinary: Negative.    Musculoskeletal: Negative.    Skin: Negative.    Neurological: Negative.    Hematological: Negative.    Psychiatric/Behavioral: Negative.         OBJECTIVE: Ht 5' 5\" (1.651 m)   Wt 200 lb (90.7 kg)   BMI 33.28 kg/m     Physical Exam  Constitutional:       General: She is not in acute distress.     Appearance: She is well-developed.   HENT:      Head: Normocephalic and atraumatic.   Eyes:      Conjunctiva/sclera: Conjunctivae normal.   Neck:      Musculoskeletal: Neck supple.   Pulmonary:      Effort: Pulmonary effort is normal.   Musculoskeletal: Normal range of motion.   Neurological:      Mental Status: She is alert " and oriented to person, place, and time.          Additional History from Old Records Summarized (2): yes  Decision to Obtain Records (1): no  Radiology Tests Summarized or Ordered (1): no  Labs Reviewed or Ordered (1): yes  Medicine Test Summarized or Ordered (1): no  Independent Review of EKG or X-RAY(2 each): no    This note was created using Dragon dictation.  Please excuse any grammatical errors.

## 2021-06-11 NOTE — PROGRESS NOTES
PLAN    BECAUSE OF GERD PROBLEMS IT IS STRONGLY ADVISED THAT Mary LOSE WEIGHT.  BELOW IS MY PLAN FOR her       Assessment:   I spent 20 minutes with the patient. 100 % of that time was spent face to face - discussion was longer today due to the fact that she has not been in to seem me since 8/2016.    SUMMARY  DIAGNOSIS - GERD - WITH ELEVATED BMI. TX WITH WEIGHT REDUCTION.     Start date 4/19/2016 start weight 192, now at 178 6/22/2017  Goal weight 173-182 - she has attained this first goal and we will try to get her to maintain this or lose a bit more eventually.   Ideal body weight: 57.6 kg (126 lb 14.9 oz)    Prescribed medicaitons; phentermine (condoms for contraception).  She said she's been out of her phentermine. She wants to try it again and promises she will return to clinic consistently monthly.  Supplements; fish oil, vitamin D 5000 IU (Recheck in August 2016 or so), mvi, chormium.  Follow up monthly.    Goal this month: increase water intake.consider shifting from diet pop to a clear zero calorie drink and from there we can wean onto water if that is not immediately possible.  Future discussion topic: Call [employer] program to look into possible compulsive eating.    Barriers to losing weight:? Compulsive eating    Recommended macronutrients;   Calories: 1470 daily  Protein 110 grams per day  carbs 50-75 grams per day     GOITER NODULAR -   4/18/17 ultrasound shows that the nodule are slightly smaller than on prior study    BP MILDLY ELEVATED (at first visit).   Normalized.  Will monitor closely in setting of phentermine and weight loss.         SUBJECTIVE: Mary Manuel is a 31 y.o. female  comes in for GERD and weight reduction. 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  She  has been doing [employer].      Since starting the weight loss program she lost weight, gained it back and then lost it again.  She is now back down to where she was and feels good.   "      Are you experiencing any side effects to the medications:  No  Hunger control:  good  Exercise was discussed: yes - she is doing yoga 10 times in the past month at [employer].  Taking supplements:  yes  Discussed journaling food:   Fasts 9pm - 7am.   Breakfast eggs/ fruit/ veggies  Lunch [employer]  Dinner: [employer]  Snacks - rare.  Patient is pleased with the current results:  She lost then gained and is now losing again.  The patient is following the nutrition plan: not sure about carbs and protein.  Barriers to losing weight: it is time consuming to track.  And she doesn't always track when she eats poorly or for emotional reasons.    ROS  A comprehensive review of systems was negative.  Pertinent items are noted in HPI.    EXAM  Initial Weight: 192 lbs  Weight: 178 lb 3.2 oz (80.8 kg)  Weight loss from initial: 13.8  % Weight loss: 7.19 %       /62 (Patient Site: Left Arm, Patient Position: Sitting, Cuff Size: Adult Large)  Pulse 74  Ht 5' 5.5\" (1.664 m)  Wt 178 lb 3.2 oz (80.8 kg)  BMI 29.2 kg/m2   Physical Exam   Constitutional: She is oriented to person, place, and time. She appears well-developed and well-nourished.   HENT:   Head: Normocephalic and atraumatic.   Eyes: Conjunctivae are normal.   Cardiovascular: Normal rate and regular rhythm.    Pulmonary/Chest: Effort normal.   Neurological: She is alert and oriented to person, place, and time.   Skin: Skin is warm and dry.   Psychiatric: She has a normal mood and affect.        "

## 2021-06-11 NOTE — TELEPHONE ENCOUNTER
Controlled Substance Refill Request  Medication Name:   Requested Prescriptions     Pending Prescriptions Disp Refills     phentermine-topiramate (QSYMIA) 3.75-23 mg 24 hour capsule 30 capsule 0     Sig: Take 1 capsule by mouth daily.     Date Last Fill: 8/28/20  Requested Pharmacy: [facility]  Submit electronically to pharmacy  Controlled Substance Agreement on file:   Encounter-Level CSA Scan Date:    There are no encounter-level csa scan date.        Last office visit:  7/24/20  Noa Ly RN, MA  [facility]    Triage Nurse Advisor

## 2021-06-11 NOTE — TELEPHONE ENCOUNTER
RN cannot approve Refill Request    RN can NOT refill this medication Rx duplicate. Last office visit: 8/7/2019 Christine Plaza MD Last Physical: Visit date not found Last MTM visit: Visit date not found Last visit same specialty: 8/7/2019 Christine Plaza MD.  Next visit within 3 mo: Visit date not found  Next physical within 3 mo: Visit date not found      Noa Ly, [facility] Triage/Med Refill 8/30/2020    Requested Prescriptions   Pending Prescriptions Disp Refills     QSYMIA 3.75-23 mg 24 hour capsule [Pharmacy Med Name: QSYMIA 3.75 MG-23 MG CAPSULE] 30 capsule 0     Sig: TAKE 1 CAPSULE BY MOUTH EVERY DAY       There is no refill protocol information for this order        SUMAtriptan (IMITREX) 100 MG tablet [Pharmacy Med Name: SUMATRIPTAN SUCC 100 MG TABLET] 9 tablet 3     Sig: TAKE 1 TABLET BY MOUTH ONCE AS NEEDED FOR MIGRAINE       Triptans Refill Protocol Passed - 8/26/2020  9:41 AM        Passed - PCP or prescribing provider visit in past 12 months       Last office visit with prescriber/PCP: 8/7/2019 Christine Plaza MD OR same dept: Visit date not found OR same specialty: 8/7/2019 Christine Plaza MD  Last physical: Visit date not found Last MTM visit: Visit date not found   Next visit within 3 mo: Visit date not found  Next physical within 3 mo: Visit date not found  Prescriber OR PCP: Christine Plaza MD  Last diagnosis associated with med order: 1. Class 2 obesity due to excess calories without serious comorbidity with body mass index (BMI) of 35.0 to 35.9 in adult  - QSYMIA 3.75-23 mg 24 hour capsule [Pharmacy Med Name: QSYMIA 3.75 MG-23 MG CAPSULE]; TAKE 1 CAPSULE BY MOUTH EVERY DAY  Dispense: 30 capsule; Refill: 0    2. Migraine without status migrainosus, not intractable, unspecified migraine type  - SUMAtriptan (IMITREX) 100 MG tablet [Pharmacy Med Name: SUMATRIPTAN SUCC 100 MG TABLET]; TAKE 1 TABLET BY MOUTH ONCE AS NEEDED FOR MIGRAINE  Dispense: 9 tablet; Refill:  3    If protocol passes may refill for 12 months if within 3 months of last provider visit (or a total of 15 months).

## 2021-06-11 NOTE — PROGRESS NOTES
PLAN    BECAUSE OF GERD PROBLEMS IT IS STRONGLY ADVISED THAT Mary LOSE WEIGHT.  BELOW IS MY PLAN FOR her       Assessment:   I spent 20 minutes with the patient. 100 % of that time was spent face to face - discussion was longer today due to the fact that she has not been in to seem me since 8/2016.    SUMMARY  DIAGNOSIS - GERD - WITH ELEVATED BMI. TX WITH WEIGHT REDUCTION.     Start date 4/19/2016 start weight 192, now at 180 7/19/2017   Goal weight 173-182 - she has attained this first goal and we will try to get her to maintain this or lose a bit more eventually.   Ideal body weight: 57.6 kg (126 lb 14.9 oz)    Prescribed medicaitons; phentermine (condoms for contraception).  She said she's been out of her phentermine. She wants to try it again and promises she will return to clinic consistently monthly.  Supplements; fish oil, vitamin D 5000 IU (Recheck in August 2016 or so), mvi, chormium.  Follow up monthly.    Goal this month: increase water intake.consider shifting from diet pop to a clear zero calorie drink and from there we can wean onto water if that is not immediately possible. Consider eat well planner for meal planning  Future discussion topic: Call [employer] program to look into possible compulsive eating.    Barriers to losing weight:? Compulsive eating    Recommended macronutrients;   Calories: 1470 daily  Protein 110 grams per day  carbs 50-75 grams per day     GOITER NODULAR -   4/18/17 ultrasound shows that the nodule are slightly smaller than on prior study    BP MILDLY ELEVATED (at first visit).   Normalized.  Will monitor closely in setting of phentermine and weight loss.   BP Readings from Last 3 Encounters:   07/19/17 122/80   06/22/17 100/62   05/17/17 114/70     ANXIETY  New problem she wants to discuss with me - plans to make follow up appt.        SUBJECTIVE: Mary Manuel is a 31 y.o. female  comes in for GERD and weight reduction. 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  She  has been doing [employer] meals.      Since starting the weight loss program she lost weight, gained it back and then lost it again.  She is now back down to where she was and feels good.        Are you experiencing any side effects to the medications:  No  Hunger control:  good  Exercise was discussed: yes - she is doing yoga 10 times in the past month at [employer].  Taking supplements:  yes  Discussed journaling food:   Fasts about 12 hours at night most of the time  Breakfast eggs/ fruit/ veggies  Lunch at work  Dinner: Variable  Snacks -she is started to snack more often.  She has noticed that since she stopped doing [employer] about a month ago she has less structure to her evening meal.  Patient is pleased with the current results:  She lost then gained and is now losing again.  The patient is following the nutrition plan: not sure about carbs and protein.  Barriers to losing weight: it is time consuming to track.  And she doesn't always track when she eats poorly or for emotional reasons.    ROS  A comprehensive review of systems was negative.  Pertinent items are noted in HPI.    EXAM  Initial Weight: 192 lbs  Weight: 180 lb (81.6 kg)  Weight loss from initial: 12  % Weight loss: 6.25 %       /80  Pulse 84  Resp 16  Wt 180 lb (81.6 kg)  BMI 29.5 kg/m2   Physical Exam   Constitutional: She is oriented to person, place, and time. She appears well-developed and well-nourished.   HENT:   Head: Normocephalic and atraumatic.   Eyes: Conjunctivae are normal.   Cardiovascular: Normal rate and regular rhythm.    Pulmonary/Chest: Effort normal.   Neurological: She is alert and oriented to person, place, and time.   Skin: Skin is warm and dry.   Psychiatric: She has a normal mood and affect.

## 2021-06-11 NOTE — PROGRESS NOTES
".  Mary Manuel is a 35 y.o. female who is being evaluated via a billable video visit.      The patient has been notified of following:     \"This video visit will be conducted via a call between you and your physician/provider. We have found that certain health care needs can be provided without the need for an in-person physical exam.  This service lets us provide the care you need with a video conversation.  If a prescription is necessary we can send it directly to your pharmacy.  If lab work is needed we can place an order for that and you can then stop by our lab to have the test done at a later time.    Video visits are billed at different rates depending on your insurance coverage. Please reach out to your insurance provider with any questions.    If during the course of the call the physician/provider feels a video visit is not appropriate, you will not be charged for this service.\"    Patient has given verbal consent to a Video visit? Yes  How would you like to obtain your AVS? AVS Preference: [email or web address].  If dropped by the video visit, the video invitation should be sent to: Other e-mail: [email or web address]  Will anyone else be joining your video visit? No        Video Start Time: 12:32 pm     Additional provider notes: none       Video-Visit Details    Type of service:  Video Visit    Video End Time (time video stopped): 1:00 pm   Originating Location (pt. Location): Home    Distant Location (provider location):  [facility] GENERAL SURGERY AND BARIATRICS CARE     Platform used for Video Visit: Domenic Mendez, SID      Medical  Weight Loss Initial Diet Evaluation  Mary is presenting today for a new weight management nutrition consultation. Pt has had an initial appointment with Dr. Plaza.  Weight loss medication: [place] .   Weight Loss Goal: to try and lose 30 lbs  Nutrition Assessment:   Anthropometrics:  Pt's Initial Weight: 208.1 lbs  Weight: 200 lb (90.7 kg)  Weight loss from initial: " 8.1  % Weight loss: 3.89 %  Current Weight (per pt's report): 198 lbs   BMI: There were no vitals filed for this visit.   IBW: 125-135 lbs   Estimated RMR ([place]-St Jeor equation): 1587 kcals/day   Recommended Protein Intake: 60-80 grams of protein/day  Medical History:  Patient Active Problem List   Diagnosis     Chronic Reflux Esophagitis     Class 2 obesity due to excess calories without serious comorbidity with body mass index (BMI) of 35.0 to 35.9 in adult     Goiter, nodular     Vitamin D deficiency     mild OCD (obsessive compulsive disorder)     Severe binge-eating disorder     Migraine without aura and without status migrainosus, not intractable     Diaphragmatic hernia     Gastroesophageal reflux disease without esophagitis      Diabetes: No   Nutrition History:   Food allergies/intolerances/cultural or religous food customs: No   Vitamins/Mineral Supplementation: Vitamin D, Mg  Dietary Recall:  Breakfast: skipped (intermittent fasting)   Snack: none   Lunch: (12-2 pm) Toast or Leftovers or Cereal or Soup   Snack: none or [employer] Drink (1 time//week)   Dinner: Chicken, Rice or Baked Potato, Vegetable   Snack: wine, crackers, cookies, candy, popcorn   Overnight eating: No  Eating out (frequency/week): 2 times/week   Hydration (type/oz. per day):  Water: some water, not enough   Caffeine: Coffee in the AM   Carbonation: Diet Coke (eliminated recently), [place] 2 cans/day   Alcohol : 2 times/week (wine)   Exercise:  Routine exercise established: No set regimen over the past month, was active prior to August   Nutrition Diagnosis (PES statement):   Overweight/Obesity (NC 3.3) related to overeating and poor lifestyle habits as evidenced by patient report of excessive energy intake especially pertaining to the evenings and BMI 33 kg/m2.   Nutrition Intervention:  1. Food and/or Nutrient Delivery   a. Placed emphasis on importance of developing a healthy meal routine, aiming for 3 meals a day and no  snacks.  2. Nutrition Education   a. Discussed with patient how to build a meal: the importance of including a lean/low fat protein at each meal, include a source of vegetables at a minimum of lunch and dinner and limiting carbohydrate intake to 1 serving per meal.  b. Educated on sources of lean protein, portion sizes, the amount of grams found in each source. Recommend patient to aim for 20-30g protein at each meal.  c. Discussed tracking intake via food journal for accountability as well as awareness regarding food choices  d. Discussed the importance of adequate hydration, with emphasis on drinking 64oz of water or zero calorie beverages per day.  3. Nutrition Counseling   a. Encouraged importance of developing routine exercise for health benefits and weight loss.    Goals established by patient:   1. Focus on increased protein intake, especially at lunch to help promote overeating in the evening, aim for 20-30 grams of protein/meal.   2. Start tracking intake via food journal mikael ([employer])   3. Re-establish an exercise regimen.   Handouts provided:  List of Lean Protein Sources (sent via email)     Assessment/Plan:    Pt will follow up in 2 week(s) with Dr Plaza and in 1 month(s) with dietitian.     Nisa Mendez RD

## 2021-06-11 NOTE — PROGRESS NOTES
CARROL WISDOM Reason for Visit: 24-Week Healthy Lifestyle Program Follow up Visit - Health Coaching  Referred by: Christine Plaza  Progress Notes:  Return Weight Management Health Coaching Note  Mary Manuel   MRN: [id number]  : 1985  CHRISTOPH: 2020  Health  Visit #: 2 of 3 pack  Telephone Visit: yes  Start Date:  2020  Graduation Date:  3/14/2021  Physician:  Dr. Plaza    ASSESSMENT:  Initial Weight: 208.1 lbs.  Current Weight (lbs): -  Average Daily Water (ounces): some, not enough, 2 cans Alyse and recently eliminated [employer]  Weight Loss Medication: phentermine  Nutrition Plan: Real whole foods    PILLAR(S) DISCUSSED IN THIS VISIT:  Exercise/Movement: walking every evening with her neighbor  Nutrition: gather information from home delivery meals (Hello Fresh, etc)  Stress Management: having to be the decision maker so much of every day. (see above in regards to nutrition)  Other: starting to talk with boss about 1 month ago in regards to getting some additional help with work.  Will have 2 more people to help. Remind self to not micro-manage that work.    GOALS:  Exercise/Movement: walking with neighbor in the evenings - continue  Nutrition: check into home delivery of meals such as Hello [employer].  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  At home work out program until 2020.  So stressed out with deciding what to do with kids going back to school     Stress scale - 7 out of 10  Migraines  Drink more wine and/or wanting to drink more  Eat more snacks after kids go to bed (coping mechanism)  Not sleeping well - leads to  "choices that aren't as healthy  Tired/stressed out:  Start to choose something not healthy, that leads to more of that same behavior.  Guilt/shame  Ennegram - 1 (self-critical)  Self-care:  Setting boundaries, communicating needs to others (family), example:  Yoga in the week (non-negotiable) physical and mental :)     Always want to do everything 150%.  \"Permission to take it down a notch\"     Information provided:  emailed information for morning and evening journal prompts and breathing exercises on 2020  Also sent this information regarding Stress Management on :  Pillar 4: Managing Stress  Pillar 4 Overview: [email or web address]                       Mindfulness Meditation:  [email or web address]  Conscious Breathing:  [email or web address]  Positive Thinking:  [email or web address]  Letting Be:  [email or web address]  Keeping a Journal:  [email or web address]  Using Your Strengths:  [email or web address]  Relationships for Good Health:  [email or web address]  Self-care Toolkit:  [email or web address]  Double-sided Journal:  [email or web address]    FOLLOW-Up:  10/26 at 1:30 pm for HC #3 of 3 pack - phone    TIME:  20 minutes spent with patient, 10 minutes charting time.     SIGNATURE:  JAZMIN Jara, [facility]  National Board Certified Health &   24 Week Healthy Lifestyle Program Health   [facility]  email:  [email or web address]  appointment [phone]  "

## 2021-06-11 NOTE — PROGRESS NOTES
"CARROL WISDOM Reason for Visit: Health Coaching visit #1 of 3  Referred by: Christine Plaza  Progress Notes:  New Weight Management Health Coaching Note  Mary Manuel   MRN: [id number]  : 1985  CHRISTOPH: 2020    Health  Visit #: 1 of 3    ASSESSMENT:  Initial Weight: 2020  Initial Start Date:  20  Graduation Date:  3/14/2021  Physician:  Dr. Plaza  Current Weight (lbs): 208.1 lbs.  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  At home work out program until 2020.  So stressed out with deciding what to do with kids going back to school    Stress scale - 7 out of 10  Migraines  Drink more wine and/or wanting to drink more  Eat more snacks after kids go to bed (coping mechanism)  Not sleeping well - leads to choices that aren't as healthy  Tired/stressed out:  Start to choose something not healthy, that leads to more of that same behavior.  Guilt/shame  Ennegram - 1 (self-critical)  Self-care:  Setting boundaries, communicating needs to others (family), example:  Yoga in the week (non-negotiable) physical and mental :)    Always want to do everything 150%.  \"Permission to take it down a notch\"    Emailed information for morning and evening journal prompts and breathing exercises on 2020    FOLLOW-Up:  2020 at 1:00 pm for HC #2 of 3    TIME:  20 minutes spent with patient, 10 " minutes charting.     SIGNATURE:  JAMZIN Jara, [facility]  National Board Certified Health &   24 Week Healthy Lifestyle Program Health   [place] [facility]  [email or web address]

## 2021-06-11 NOTE — TELEPHONE ENCOUNTER
Central PA team  [phone]  Pool: HE PA MED ([id number])          PA has been initiated.       PA form completed and faxed insurance via Cover My Meds     Key:  [id number]     Medication:  LOMAIRA     Insurance:  [employer]         Response will be received via fax and may take up to 5-10 business days depending on plan

## 2021-06-12 NOTE — TELEPHONE ENCOUNTER
RN cannot approve Refill Request    RN can NOT refill this medication historical medication requested. Last office visit: 8/7/2019 Christine Plaza MD Last Physical: 9/25/2020 Last MTM visit: Visit date not found Last visit same specialty: 8/7/2019 Christine Plaza MD.  Next visit within 3 mo: Visit date not found  Next physical within 3 mo: Visit date not found      Joanne Cheung, [facility] Triage/Med Refill 10/19/2020    Requested Prescriptions   Pending Prescriptions Disp Refills     sertraline (ZOLOFT) 50 MG tablet [Pharmacy Med Name: SERTRALINE HCL 50 MG TABLET] 90 tablet 0     Sig: TAKE 1 TABLET BY MOUTH EVERY DAY       [facility] Refill Protocol  Passed - 10/18/2020 12:49 AM        Passed - PCP or prescribing provider visit in last year     Last office visit with prescriber/PCP: 8/7/2019 Christine Plaza MD OR same dept: Visit date not found OR same specialty: 8/7/2019 Christine Plaza MD  Last physical: 9/25/2020 Last MTM visit: Visit date not found   Next visit within 3 mo: Visit date not found  Next physical within 3 mo: Visit date not found  Prescriber OR PCP: Christine Plaza MD  Last diagnosis associated with med order: 1. Mixed obsessional thoughts and acts  - sertraline (ZOLOFT) 50 MG tablet [Pharmacy Med Name: SERTRALINE HCL 50 MG TABLET]; TAKE 1 TABLET BY MOUTH EVERY DAY  Dispense: 90 tablet; Refill: 0    If protocol passes may refill for 12 months if within 3 months of last provider visit (or a total of 15 months).

## 2021-06-12 NOTE — PROGRESS NOTES
PLAN    BECAUSE OF GERD PROBLEMS IT IS STRONGLY ADVISED THAT Mary LOSE WEIGHT.  BELOW IS MY PLAN FOR her       Assessment:    We spent 20 minutes today in direct patient contact, 100% of the time in consultation concerning medical problems as listed below.      SUMMARY  DIAGNOSIS - GERD - WITH ELEVATED BMI. TX WITH WEIGHT REDUCTION.     Start date 4/19/2016 start weight 192, now at 176 8/23/2017   Goal weight 173-182 - she has attained this first goal and we will try to get her to maintain this or lose a bit more eventually.   Ideal body weight: 57.6 kg (126 lb 14.9 oz)    Prescribed medicaitons; phentermine (condoms for contraception).  She said she's been out of her phentermine. She wants to try it again and promises she will return to clinic consistently monthly.  Supplements; fish oil, vitamin D 5000 IU (Recheck in August 2016 or so), mvi, chormium.  Follow up monthly.    Goal this month: this month was easier. She is sleeping better with lexapro. Wants to continue on lexapro (started last month) and see how it goes. Next time I want to touch base on her GERD (was worsening as of 12/12/16 note from Dr. Toney and she was increasing meds then) - now weight is down nearly 20 ls so lets see how gerd is with that change.  Future discussion topic: Call [facility] to look into possible compulsive eating.    Barriers to losing weight:? Compulsive eating    Recommended macronutrients;   Calories: 1470 daily  Protein 110 grams per day  carbs 50-75 grams per day     GOITER NODULAR -   4/18/17 ultrasound shows that the nodule are slightly smaller than on prior study    BP MILDLY ELEVATED (at first visit).   Normalized.  Will monitor closely in setting of phentermine and weight loss.   BP Readings from Last 3 Encounters:   07/19/17 122/80   06/22/17 100/62   05/17/17 114/70     MILD OCD/ ANXIETY  Taking lexapro which is really helping a lot.    GERD  Taking over the counter reflux meds frequently.         SUBJECTIVE: Mary Manuel is a 31 y.o. female  comes in for GERD and weight reduction. 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  She  has been doing [employer] meals.      This month started lexapro and feels like things are easier. She is sleeping better not thinking so much before bed.     Are you experiencing any side effects to the medications:  No  Hunger control:  good  Exercise was discussed: yes - she is doing yoga 10 times in the past month at [employer].  Taking supplements:  yes  Discussed journaling food:   Fasts about 12 hours at night most of the time  Breakfast eggs/ fruit/ veggies  Lunch at work  Dinner: Variable  Snacks -she is started to snack more often.  She has noticed that since she stopped doing [employer] int he past she has less structure to her evening meal.  Patient is pleased with the current results:  She lost then gained and is now losing again.  The patient is following the nutrition plan: not sure about carbs and protein.  Barriers to losing weight: it is time consuming to track.  And she doesn't always track when she eats poorly or for emotional reasons.  History   Smoking Status     Never Smoker   Smokeless Tobacco     Never Used      Current Outpatient Prescriptions on File Prior to Visit   Medication Sig Dispense Refill     pantoprazole (PROTONIX) 40 MG tablet TAKE 1 TABLET (40 MG TOTAL) BY MOUTH DAILY. 30 tablet 12     [DISCONTINUED] escitalopram oxalate (LEXAPRO) 10 MG tablet Take 1 tablet (10 mg total) by mouth daily. 30 tablet 1     [DISCONTINUED] phentermine (ADIPEX-P) 37.5 mg tablet Take 1 tablet (37.5 mg total) by mouth daily before breakfast. 30 tablet 0     No current facility-administered medications on file prior to visit.       Allergies   Allergen Reactions     Ciprofloxacin      Sulfamethoxazole-Trimethoprim      Review of Systems   Constitutional: Negative.    HENT: Negative.    Eyes: Negative.    Respiratory:  "Negative.    Cardiovascular: Negative.    Gastrointestinal: Negative.    Endocrine: Negative.    Genitourinary: Negative.    Musculoskeletal: Negative.    Skin: Negative.    Neurological: Negative.    Hematological: Negative.    Psychiatric/Behavioral: Negative.      When asked about depression she says she is not depressed and really enjoys life. Now able to sleep better because lexapro helping her not to think so much and make sleep difficult.     OBJECTIVE: /80  Pulse 80  Resp 16  Ht 5' 5.5\" (1.664 m)  Wt 176 lb (79.8 kg)  Breastfeeding? No  BMI 28.84 kg/m2  Physical Exam   Constitutional: She is oriented to person, place, and time. She appears well-developed and well-nourished. No distress.   HENT:   Head: Normocephalic and atraumatic.   Eyes: Conjunctivae are normal.   Neck: Neck supple.   Cardiovascular: Normal rate and regular rhythm.    Pulmonary/Chest: Effort normal.   Musculoskeletal: Normal range of motion.   Neurological: She is alert and oriented to person, place, and time.   Skin: Skin is warm and dry.   Psychiatric: She has a normal mood and affect. Her behavior is normal. Judgment and thought content normal.          "

## 2021-06-12 NOTE — PROGRESS NOTES
ASSESSMENT AND PLAN:      Problem List Items Addressed This Visit        Unprioritized    Goiter, nodular     9/29/2020 stable thyroid us. 10/22/20 endocrine appt - plan tsh in 1 year and us in 2 yrs         Encounter for preventive care     Chart review: 9/25/20 nl cmp, nl tsh, nl cbc, d 36.2, lipid normal. a1c 5.1         Butterfly rash - Primary     Patient presents today with a rash today on her face. She has been seeing her dermatologist but it is not improving. The dermatologist has been treating her for rosacea.  She says she does not have notes, and I also do not the derm notes. But looking at her facial rash I see that the dermatologist must have worried about lupus and if this rash might represent a butterfly rash although it is not completely typical.    The patient also has granuloma annulare - so worry is that she has undiagnosed lupus or other autoimmune process.    With both Granuloma annulare  And new Butterfly rash, I will Check jermaine, ua (looking for proteinuria, casts, pyuria), sed rate and crp, serum protein electrophoresis.  If anything is positive plan follow up. If normal follow up prn.         Relevant Orders    Erythrocyte Sedimentation Rate (Completed)    C-Reactive Protein (Completed)    Antinuclear Antibody (JERMAINE) Cascade    HM1(CBC and Differential) (Completed)    Electrophoresis, Protein, Serum    Urinalysis (Completed)           Chief Complaint   Patient presents with     Weight Loss        HPI  Mary Manuel is a 35 y.o. female presents because her dermatologist said the rash on her face might have autoimmune etiology.     Social History     Tobacco Use   Smoking Status Never Smoker   Smokeless Tobacco Never Used      Current Outpatient Medications on File Prior to Visit   Medication Sig Dispense Refill     bethanechol (URECHOLINE) 10 MG tablet        cetirizine (ZYRTEC) 10 MG tablet Take 1 tablet by mouth.       cholecalciferol, vitamin D3, 1,000 unit capsule Take 1 capsule by  mouth daily. 5000       clobetasoL (TEMOVATE) 0.05 % cream 2 (two) times a day.        doxycycline (MONODOX) 100 MG capsule        fluconazole (DIFLUCAN) 150 MG tablet TAKE 1 TABLET BY MOUTH AS ONE DOSE       magnesium 250 mg Tab Take 1 tablet by mouth daily. 400       metroNIDAZOLE (METROCREAM) 0.75 % cream APPLY TO FACE EVERY DAY  6     pantoprazole (PROTONIX) 40 MG tablet TAKE 1 TABLET (40 MG TOTAL) BY MOUTH DAILY. (Patient taking differently: TAKE 1 TABLET (40 MG TOTAL) BY MOUTH BID) 90 tablet 3     phentermine (LOMAIRA) 8 mg Tab Take 8 mg by mouth 3 (three) times a day. 90 each 0     sertraline (ZOLOFT) 50 MG tablet TAKE 1 TABLET BY MOUTH EVERY DAY 90 tablet 0     SOOLANTRA 1 % Crea        sucralfate (CARAFATE) 1 gram tablet        SUMAtriptan (IMITREX) 100 MG tablet Take 1 tablet (100 mg total) by mouth once as needed for migraine (once daily as needed). 9 tablet 6     No current facility-administered medications on file prior to visit.       Allergies   Allergen Reactions     Amoxicillin-Pot Clavulanate Nausea And Vomiting, Swelling and Angioedema     Other reaction(s): Throat Swelling/Closing     Betamethasone Swelling     Ciprofloxacin      Potassium Clavulanate Other (See Comments)     Sulfamethoxazole-Trimethoprim        Review of Systems   Constitutional: Negative.    HENT: Negative.    Eyes: Negative.    Respiratory: Negative.    Cardiovascular: Negative.    Gastrointestinal: Negative.    Endocrine: Negative.    Genitourinary: Negative.    Musculoskeletal: Negative.    Skin: Negative.    Neurological: Negative.    Hematological: Negative.    Psychiatric/Behavioral: Negative.         OBJECTIVE: /70 (Patient Site: Left Arm, Patient Position: Sitting, Cuff Size: Adult Large)   Pulse 94   Wt 205 lb (93 kg)   SpO2 97%   BMI 34.11 kg/m     Physical Exam  Constitutional:       General: She is not in acute distress.     Appearance: She is well-developed.   HENT:      Head: Normocephalic and  atraumatic.   Eyes:      Conjunctiva/sclera: Conjunctivae normal.   Neck:      Musculoskeletal: Neck supple.   Cardiovascular:      Rate and Rhythm: Normal rate and regular rhythm.   Pulmonary:      Effort: Pulmonary effort is normal.   Musculoskeletal: Normal range of motion.   Skin:     General: Skin is warm and dry.      Comments: Skin over lower half of face looks pink, dry and almost like she has a sunburn.  It is not in a typical butterfly pattern but rather covers the lower half of her face from the bridge of her nose to her chin bilaterally. There is however something reminiscent of a lupus butterfly rash vs. Rosacea.   Neurological:      Mental Status: She is alert and oriented to person, place, and time.          Additional History from Old Records Summarized (2): yes  Decision to Obtain Records (1): yes she is asked to sign moises to get derm records.  Radiology Tests Summarized or Ordered (1): no  Labs Reviewed or Ordered (1): yes  Medicine Test Summarized or Ordered (1): no  Independent Review of EKG or X-RAY(2 each): no    This note was created using Dragon dictation.  Please excuse any grammatical errors.

## 2021-06-12 NOTE — TELEPHONE ENCOUNTER
No answer, left message for health coaching appt #3 of 3 pack.    Send mychart message to help reschedule.    JAZMIN Jara, [facility]  National Board Certified Health &   24 Week Healthy Lifestyle Program Health   [place] [facility]  [phone]  email:  [email or web address]

## 2021-06-12 NOTE — PROGRESS NOTES
ASSESSMENT AND PLAN:   We spent 25 minutes today in direct patient contact, 100% of the time in consultation concerning medical problems as listed below.   Problem List Items Addressed This Visit     mild OCD (obsessive compulsive disorder)     Mary Manuel is a 31 y.o. female who describes feeling constantly consumed with watching the time and making sure she gets her to do list done.  She says sometimes that things have her to do list are completely ridiculous and she still becomes obsessed.  For example recently she wanted to get some new blinds in her bedroom and spent 4 hours on her phone looking for the perfect blinds.  She notices that oftentimes (but not always) her obsessions are surrounding shopping.  She feels like this is really hindering her life because she does not feel like playing with her kids or interacting with her family because she is always got something pressing on her to do list even though she feels like logically these things should not be pressing issues.    Discussed meds vs CBT.   She wanted to start with meds due to time and financial constraints.   Start lexapro 20 mg po q day. #30 - 1 rf  Follow up in one month to titrate treatment.              Chief Complaint   Patient presents with     Anxiety     HPI  Mary Manuel is a 31 y.o. female comes in today to some mood issues that she has been having.  She says that she notices that on a Saturday when there is nothing going on and it should be relaxing day she just cannot allow herself to relax.  She notices that her  plays with her kids and that her kids feel like her  is much more interactive them.  She feels sad that she is not able to relax and interact with them but is obsessed with completeing things her to do list.  However, she tells me that a lot of things in her to do list are really not that important.  She feels like she just cannot let go if this incredible desire to focus in on her task and  dislikes that she will ignore  her loved ones to do so.    History   Smoking Status     Never Smoker   Smokeless Tobacco     Never Used      Current Outpatient Prescriptions on File Prior to Visit   Medication Sig Dispense Refill     pantoprazole (PROTONIX) 40 MG tablet TAKE 1 TABLET (40 MG TOTAL) BY MOUTH DAILY. 30 tablet 12     phentermine (ADIPEX-P) 37.5 mg tablet Take 1 tablet (37.5 mg total) by mouth daily before breakfast. 30 tablet 0     No current facility-administered medications on file prior to visit.       Allergies   Allergen Reactions     Ciprofloxacin      Sulfamethoxazole-Trimethoprim      Review of Systems   Constitutional: Negative.    HENT: Negative.    Eyes: Negative.    Respiratory: Negative.    Cardiovascular: Negative.    Gastrointestinal: Negative.    Endocrine: Negative.    Genitourinary: Negative.    Musculoskeletal: Negative.    Skin: Negative.    Neurological: Negative.    Hematological: Negative.    Psychiatric/Behavioral: Negative.      When asked about depression she says she is not depressed and really enjoys life.    OBJECTIVE: /70 (Patient Site: Left Arm, Patient Position: Sitting, Cuff Size: Adult Large)  Pulse 74  Wt 180 lb (81.6 kg)  Breastfeeding? No  BMI 29.5 kg/m2  Physical Exam   Constitutional: She is oriented to person, place, and time. She appears well-developed and well-nourished. No distress.   HENT:   Head: Normocephalic and atraumatic.   Eyes: Conjunctivae are normal.   Neck: Neck supple.   Cardiovascular: Normal rate and regular rhythm.    Pulmonary/Chest: Effort normal.   Musculoskeletal: Normal range of motion.   Neurological: She is alert and oriented to person, place, and time.   Skin: Skin is warm and dry.   Psychiatric: She has a normal mood and affect. Her behavior is normal. Judgment and thought content normal.     Corbin positive for anxiety see score.   She declined to do phq as she states that she is not at all depressed.

## 2021-06-13 NOTE — PROGRESS NOTES
PLAN    BECAUSE OF GERD PROBLEMS IT IS STRONGLY ADVISED THAT Mary LOSE WEIGHT.  BELOW IS MY PLAN FOR her       Assessment:    We spent 25 minutes today in direct patient contact, 100% of the time in consultation concerning medical problems as listed below.      SUMMARY  DIAGNOSIS - GERD - WITH ELEVATED BMI. TX WITH WEIGHT REDUCTION.     Start date 4/19/2016 start weight 192, now at 179 10/31/2017   Goal weight 173-182 - she has attained this first goal and we will try to get her to maintain this or lose a bit more eventually.   Ideal body weight: 57.6 kg (126 lb 14.9 oz)    Prescribed medicaitons; phentermine began weaning 10/31/2017 - taking 5/7 days per week.  Supplements; fish oil, vitamin D 5000 IU (Recheck in August 2016 or so), mvi, chormium.  Follow up in 8 weeks.     Goal this month: this month was easier. Really focus on healthy fats when getting hungry.    Future discussion topic: Call [employer] program to look into possible compulsive eating.    Barriers to losing weight:? Compulsive eating    Recommended macronutrients;   Calories: 1470 daily  Protein 110 grams per day  carbs 50-75 grams per day     Problem List Items Addressed This Visit     Chronic Reflux Esophagitis (Chronic)     She still getting daily symptoms if she forgets to take her Protonix by 9 AM.  Continue Protonix orally 40 mg per day.         BMI 31.0-31.9,adult (Chronic)     Initial Weight: 192 lbs  Weight: 179 lb (81.2 kg)  Weight loss from initial: 13  % Weight loss: 6.77 %     Her weight has been plateaued for quite some time.  She has been afraid to get off of the phentermine but I think she is ready now so we will wean down to 5/7 days per week.  We are starting to wean today on October 31, 2017.    She is still afraid to eat healthy fats such as butter etc. so she is avoiding those.  I have reiterated that she will feel extremely hungry if she is not eating healthy fats to stay satiated.  She seemed to understand this and will try  to implement this.    CAP PLAN - weight daily.  Control less than or equal to 180  Action 181-184  Panic 185         mild OCD (obsessive compulsive disorder) (Chronic)     Feels like lexapro 10 mg daily works well for her.  She is sleeping better, she feels less like compulsively eating and is wondering if she can increase the dose as she is having no side effects.    I will prescribe Lexapro 20 mg orally per day.  Quantity 90 with no refills    Also we discussed using NAC also known as N-acetylcysteine which is known to help with compulsive behaviors and is found over-the-counter.         Relevant Medications    phentermine (ADIPEX-P) 37.5 mg tablet    escitalopram oxalate (LEXAPRO) 10 MG tablet         SUBJECTIVE: Mary Manuel is a 31 y.o. female  comes in for GERD and weight reduction. 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  She  has been doing [employer] meals.      This month started lexapro and feels like things are easier. She is sleeping better not thinking so much before bed.     Are you experiencing any side effects to the medications:  No  Hunger control:  good  Exercise was discussed: yes - she is doing yoga 10 times in the past month at [facility].  Taking supplements:  yes  Discussed journaling food:   Fasts about 12 hours at night most of the time  Wakes up 5:15   6-7 has coffee with 1% millk  9am nosa yogut with fruit (full fat), sometimes eggs  Lunch - protein, with a salad  Dinner: same as lunch.  Snacks -she is started to snack more often.  She has noticed that since she stopped doing [employer] int he past she has less structure to her evening meal.  Patient is pleased with the current results:  She lost then gained and is now losing again.  The patient is following the nutrition plan: not sure about carbs and protein.  Barriers to losing weight: it is time consuming to track.  And she doesn't always track when she eats poorly or for emotional  reasons.  History   Smoking Status     Never Smoker   Smokeless Tobacco     Never Used      Current Outpatient Prescriptions on File Prior to Visit   Medication Sig Dispense Refill     escitalopram oxalate (LEXAPRO) 10 MG tablet Take 1 tablet (10 mg total) by mouth daily. 30 tablet 1     pantoprazole (PROTONIX) 40 MG tablet TAKE 1 TABLET (40 MG TOTAL) BY MOUTH DAILY. 30 tablet 12     phentermine (ADIPEX-P) 37.5 mg tablet Take 1 tablet (37.5 mg total) by mouth daily before breakfast. 30 tablet 0     No current facility-administered medications on file prior to visit.       Allergies   Allergen Reactions     Ciprofloxacin      Sulfamethoxazole-Trimethoprim      Review of Systems   Constitutional: Negative.    HENT: Negative.    Eyes: Negative.    Respiratory: Negative.    Cardiovascular: Negative.    Gastrointestinal: Negative.    Endocrine: Negative.    Genitourinary: Negative.    Musculoskeletal: Negative.    Skin: Negative.    Neurological: Negative.    Hematological: Negative.    Psychiatric/Behavioral: Negative.      When asked about depression she says she is not depressed and really enjoys life. Now able to sleep better because lexapro helping her not to think so much and make sleep difficult.     OBJECTIVE: /74  Pulse 80  Resp 16  Wt 179 lb (81.2 kg)  Breastfeeding? No  BMI 29.33 kg/m2     Initial Weight: 192 lbs  Weight: 179 lb (81.2 kg)  Weight loss from initial: 13  % Weight loss: 6.77 %     Physical Exam   Constitutional: She is oriented to person, place, and time. She appears well-developed and well-nourished. No distress.   HENT:   Head: Normocephalic and atraumatic.   Eyes: Conjunctivae are normal.   Neck: Neck supple.   Cardiovascular: Normal rate and regular rhythm.    Pulmonary/Chest: Effort normal.   Musculoskeletal: Normal range of motion.   Neurological: She is alert and oriented to person, place, and time.   Skin: Skin is warm and dry.   Psychiatric: She has a normal mood and affect.  Her behavior is normal. Judgment and thought content normal.

## 2021-06-13 NOTE — PROGRESS NOTES
[facility]  [address]  [facility] [address]  Dept: [phone]  Dept Fax: [phone]  Primary Provider: Christine Plaza MD    PREOPERATIVE EVALUATION:  Today's date: 11/27/2020    Mary Manuel is a 35 y.o. female who presents for a preoperative evaluation.    Surgical Information:  Surgery/Procedure: HIATAL HERNIA REPAIR WITH TOUPET FUNDOPLICATION  Surgery Location: [facility]  Surgeon: Dr.Eric Plata  Surgery Date: Wednesday 12/2/2020  Time of Surgery: 12pm  Where patient plans to recover: At home with family  Fax number for surgical facility: not available at writing of this note.     Type of Anesthesia Anticipated: to be determined    Subjective     HPI related to upcoming procedure: moderate to severe gerd ongoing and refractory to treatment with diet, meds and lifestyle modificaiton.  Patient finally is planning to have hernia repair and fundiplication.     Preop Questions 11/27/2020   Have you ever had a heart attack or stroke? No   Have you ever had surgery on your heart or blood vessels, such as a stent placement, a coronary artery bypass, or surgery on an artery in your head, neck, heart, or legs? No   Do you have chest pain with activity? No   Do you have a history of  heart failure? No   Do you currently have a cold, bronchitis or symptoms of other infection? No   Do you have a cough, shortness of breath, or wheezing? No   Do you or anyone in your family have previous history of blood clots? No   Do you or does anyone in your family have a serious bleeding problem such as prolonged bleeding following surgeries or cuts? No   Have you ever had problems with anemia or been told to take iron pills? UNKNOWN - no recent anemia.   Lab Results   Component Value Date    HGB 12.8 11/27/2020       Have you had any abnormal blood loss such as black, tarry or bloody stools, or abnormal vaginal bleeding? No   Have you ever had a blood transfusion? No   Are  you willing to have a blood transfusion if it is medically needed before, during, or after your surgery? Yes   Have you or any of your relatives ever had problems with anesthesia? No   Do you have sleep apnea, excessive snoring or daytime drowsiness? No   Do you have any artifical heart valves or other implanted medical devices like a pacemaker, defibrillator, or continuous glucose monitor? No   Do you have artificial joints? No   Are you allergic to latex? No   Is there any chance that you may be pregnant? No     Health Care Directive:  Patient does not have a Health Care Directive or Living Will: Discussed advance care planning with patient; information given to patient to review.    Review of Systems   Constitutional: Negative.    HENT: Negative.    Eyes: Negative.    Respiratory: Negative.    Cardiovascular: Negative.    Gastrointestinal: Negative.    Endocrine: Negative.    Genitourinary: Negative.    Musculoskeletal: Negative.    Skin: Negative.    Neurological: Negative.    Hematological: Negative.    Psychiatric/Behavioral: Negative.          Patient Active Problem List    Diagnosis Date Noted     Esophageal dysmotility 11/26/2020     Butterfly rash 10/29/2020     Polyp of duodenum 10/19/2020     Bruxism 09/25/2020     Encounter for preventive care 09/25/2020     Migraine without aura and without status migrainosus, not intractable 08/08/2019     Severe binge-eating disorder 07/17/2018     Diaphragmatic hernia 04/23/2018     mild OCD (obsessive compulsive disorder) 07/20/2017     Vitamin D deficiency 04/21/2016     Class 2 obesity due to excess calories without serious comorbidity with body mass index (BMI) of 35.0 to 35.9 in adult 04/19/2016     Goiter, nodular 04/19/2016     Chronic Reflux Esophagitis      Past Medical History:   Diagnosis Date     Gastroesophageal reflux disease without esophagitis 4/9/2018     Joint Pain, Localized In The Left Shoulder     Created by Conversion      Past Surgical  History:   Procedure Laterality Date     WISDOM TOOTH EXTRACTION       Current Outpatient Medications   Medication Sig Dispense Refill     cetirizine (ZYRTEC) 10 MG tablet Take 1 tablet by mouth.       clobetasoL (TEMOVATE) 0.05 % cream 2 (two) times a day.        magnesium 250 mg Tab Take 1 tablet by mouth daily. 400       pantoprazole (PROTONIX) 40 MG tablet TAKE 1 TABLET (40 MG TOTAL) BY MOUTH DAILY. (Patient taking differently: TAKE 1 TABLET (40 MG TOTAL) BY MOUTH BID) 90 tablet 3     sertraline (ZOLOFT) 50 MG tablet TAKE 1 TABLET BY MOUTH EVERY DAY (Patient taking differently: 100 mg. ) 90 tablet 0     SOOLANTRA 1 % Crea        SUMAtriptan (IMITREX) 100 MG tablet Take 1 tablet (100 mg total) by mouth once as needed for migraine (once daily as needed). 9 tablet 6     bethanechol (URECHOLINE) 10 MG tablet        cholecalciferol, vitamin D3, 1,000 unit capsule Take 1 capsule by mouth daily. 5000       doxycycline (MONODOX) 100 MG capsule        phentermine (LOMAIRA) 8 mg Tab Take 8 mg by mouth 3 (three) times a day. 90 each 0     sucralfate (CARAFATE) 1 gram tablet        No current facility-administered medications for this visit.        Allergies   Allergen Reactions     Amoxicillin-Pot Clavulanate Nausea And Vomiting, Swelling and Angioedema     Other reaction(s): Throat Swelling/Closing     Betamethasone Swelling     Ciprofloxacin      Potassium Clavulanate Other (See Comments)     Sulfamethoxazole-Trimethoprim        Social History     Tobacco Use     Smoking status: Never Smoker     Smokeless tobacco: Never Used   Substance Use Topics     Alcohol use: Not on file      Family History   Problem Relation Age of Onset     Hypertension Mother      Mane-Danlos syndrome Mother         vascular type     Hypertension Father      Hyperlipidemia Father      No Medical Problems Sister      No Medical Problems Brother      Social History     Substance and Sexual Activity   Drug Use Not on file        Objective     BP  "106/70   Pulse 88   Resp 16   Ht 5' 5.5\" (1.664 m)   Wt 207 lb (93.9 kg)   LMP 11/26/2020 (Exact Date)   Breastfeeding No   BMI 33.92 kg/m    Physical Exam   Constitutional: She is oriented to person, place, and time. She appears well-nourished.   HENT:   Head: Normocephalic and atraumatic.   Eyes: Pupils are equal, round, and reactive to light. Conjunctivae and EOM are normal.   Neck: Normal range of motion. Neck supple.   Cardiovascular: Normal rate, regular rhythm, normal heart sounds and intact distal pulses.   Pulmonary/Chest: Effort normal and breath sounds normal.   Abdominal: Soft. Bowel sounds are normal.   Musculoskeletal: Normal range of motion.   Neurological: She is alert and oriented to person, place, and time.   Skin: Skin is warm and dry.   Psychiatric: She has a normal mood and affect. Her behavior is normal. Judgment and thought content normal.   Nursing note and vitals reviewed.    Recent Labs   Lab Test 11/27/20  1552 10/28/20  0804 09/25/20  1626   HGB 12.8 13.1 13.0   PLT  --  231 273   NA  --   --  139   K  --   --  4.0   CREATININE  --   --  0.83        PRE-OP Diagnostics:   Labs pending at this time. Results will be reviewed when available.  No EKG required, no history of coronary heart disease, significant arrhythmia, peripheral arterial disease or other structural heart disease.    REVISED CARDIAC RISK INDEX (RCRI)   The patient has the following serious cardiovascular risks for perioperative complications:   - No serious cardiac risks = 0 points    RCRI INTERPRETATION: 0 points: Class I (very low risk - 0.4% complication rate)         Assessment & Plan      The proposed surgical procedure is considered INTERMEDIATE risk.    Problem List Items Addressed This Visit        Unprioritized    Chronic Reflux Esophagitis (Chronic)     Surgery/Procedure: HIATAL HERNIA REPAIR WITH TOUPET FUNDOPLICATION  Surgery Location: [facility]  Surgeon: Dr.Eric Plata  Surgery Date: Wednesday " 12/2/2020  Time of Surgery: 12pm         Diaphragmatic hernia     Surgery/Procedure: HIATAL HERNIA REPAIR WITH TOUPET FUNDOPLICATION  Surgery Location: [facility]  Surgeon: Dr.Eric Plata  Surgery Date: Wednesday 12/2/2020  Time of Surgery: 12pm           Other Visit Diagnoses     Preoperative examination    -  Primary    Relevant Orders    Pregnancy (Beta-hCG, Qual), Urine (Completed)    Basic Metabolic Panel    Hemoglobin (Completed)            Risks and Recommendations:  The patient has the following additional risks and recommendations for perioperative complications:   - No identified additional risk factors other than previously addressed    Medication Instructions:  patient has stopped all meds at the time of her preopand says she will not take them again until after surgery, except protonix and zoloft.  And, Zoloft is an evening medicion so it should not be an issue to take and she was told she can drink water until 6am so she will take her Protonix at 6am with water and then start full npo after that.     RECOMMENDATION:  APPROVAL GIVEN to proceed with proposed procedure, without further diagnostic evaluation.    Signed Electronically by: Christine Plaza MD    Copy of this evaluation report is provided to requesting physician.

## 2021-06-14 NOTE — TELEPHONE ENCOUNTER
Left message to call back for: guanaco  Information to relay to patient: appt needed for IUD with Karthik

## 2021-06-14 NOTE — TELEPHONE ENCOUNTER
RN cannot approve Refill Request    RN can NOT refill this medication PCP to review - patient to schedule f/u prior to next refill per chart. Last office visit: 6/4/2018 Nikunj Pineda DO Last Physical: Visit date not found Last MTM visit: Visit date not found Last visit same specialty: 10/28/2020 Christine Plaza MD.  Next visit within 3 mo: Visit date not found  Next physical within 3 mo: Visit date not found      Elza Steward, [facility] Connection Triage/Med Refill 1/22/2021    Requested Prescriptions   Pending Prescriptions Disp Refills     sertraline (ZOLOFT) 100 MG tablet [Pharmacy Med Name: SERTRALINE  MG TABLET] 30 tablet 1     Sig: TAKE 1 TABLET BY MOUTH EVERY DAY       SSRI Refill Protocol  Passed - 1/22/2021  7:34 AM        Passed - PCP or prescribing provider visit in last year     Last office visit with prescriber/PCP: 6/4/2018 Nikunj Pineda DO OR same dept: 10/28/2020 Christine Plaza MD OR same specialty: 10/28/2020 Christine Plaza MD  Last physical: Visit date not found Last MTM visit: Visit date not found   Next visit within 3 mo: Visit date not found  Next physical within 3 mo: Visit date not found  Prescriber OR PCP: Nikunj Pineda DO  Last diagnosis associated with med order: 1. Mixed obsessional thoughts and acts  - sertraline (ZOLOFT) 100 MG tablet [Pharmacy Med Name: SERTRALINE  MG TABLET]; TAKE 1 TABLET BY MOUTH EVERY DAY  Dispense: 30 tablet; Refill: 1    If protocol passes may refill for 12 months if within 3 months of last provider visit (or a total of 15 months).

## 2021-06-14 NOTE — TELEPHONE ENCOUNTER
Patient had video visit with  today to discuss birth control.      She needs to be scheduled for an IUD insertion with  during her next period.     Tried calling Mary to help get this scheduled, had to leave her a voicemail.       Lorie Hernandez, [facility] 1/27/2021 3:51 PM   Clarence SCHULZ

## 2021-06-14 NOTE — TELEPHONE ENCOUNTER
Dose changed  (increased) 9/2020, follow up phq9 never done (was supposed to be done 1 month later).  She did come for a preop, but I do not think we addressed her zoloft then.  Virtual visit could be an option.  If doing well could also administer phq9 and if looks good no visit would be necessarily needed unless desired.

## 2021-06-14 NOTE — TELEPHONE ENCOUNTER
RN cannot approve Refill Request    RN can NOT refill this medication med is not covered by policy/route to provider. Last office visit: 10/28/2020 Christine Plaza MD Last Physical: 11/27/2020 Last MTM visit: Visit date not found Last visit same specialty: 10/28/2020 Christine Plaza MD.  Next visit within 3 mo: Visit date not found  Next physical within 3 mo: Visit date not found      Bernie Espinosa, [facility] Connection Triage/Med Refill 1/27/2021    Requested Prescriptions   Pending Prescriptions Disp Refills     fluconazole (DIFLUCAN) 150 MG tablet [Pharmacy Med Name: FLUCONAZOLE 150 MG TABLET] 1 tablet 0     Sig: TAKE 1 TABLET BY MOUTH AS ONE DOSE       There is no refill protocol information for this order

## 2021-06-14 NOTE — PROGRESS NOTES
"Mary Manuel is a 35 y.o. female who is being evaluated via a billable video visit.      How would you like to obtain your AVS? MyChart.  If dropped from the video visit, the video invitation should be resent by: Text to cell phone: [phone]   Will anyone else be joining your video visit? No    Video Start Time: 3:34 PM  Assessment & Plan   Problem List Items Addressed This Visit        Unprioritized    Encounter for other contraceptive management     Took ocp about 8 years ago.  She took seasonique 8 years ago. But due to migraine with intermittent aura I advised against ocp now. She agrees not wanting her risk for stroke to increase.  She wants to get an IUD.      Will schedule with Dr. Angeles for IUD placement.                 BMI:   Estimated body mass index is 33.92 kg/m  as calculated from the following:    Height as of 11/27/20: 5' 5.5\" (1.664 m).    Weight as of 11/27/20: 207 lb (93.9 kg).     No follow-ups on file.    Christine Plaza MD  [facility]      Subjective     Mary Manuel is 35 y.o. and presents to clinic today for the following health issues         Objective         Physical Exam   Constitutional: She is oriented to person, place, and time. She appears well-developed and well-nourished.   HENT:   Head: Normocephalic and atraumatic.   Neck: Normal range of motion. Neck supple.   Pulmonary/Chest: Effort normal.   Neurological: She is alert and oriented to person, place, and time.     Video-Visit Details    Type of service:  Video Visit    Video End Time (time video stopped): 3:48P  Originating Location (pt. Location): Home    Distant Location (provider location):  [facility]     Platform used for Video Visit: Domenic"

## 2021-06-14 NOTE — TELEPHONE ENCOUNTER
RN cannot approve Refill Request    RN can NOT refill this medication pt report is taking 100 mg daily. Last office visit: 10/28/2020 Christine Plaza MD Last Physical: 11/27/2020 Last MTM visit: Visit date not found Last visit same specialty: 10/28/2020 Christine Plaza MD.  Next visit within 3 mo: Visit date not found  Next physical within 3 mo: Visit date not found      Joanne Cheung, [facility] Connection Triage/Med Refill 12/29/2020    Requested Prescriptions   Pending Prescriptions Disp Refills     sertraline (ZOLOFT) 50 MG tablet 90 tablet 0     Sig: Take 1 tablet (50 mg total) by mouth daily.       SSRI Refill Protocol  Passed - 12/28/2020 11:29 PM        Passed - PCP or prescribing provider visit in last year     Last office visit with prescriber/PCP: 10/28/2020 Christine Plaza MD OR same dept: Visit date not found OR same specialty: 10/28/2020 Christine Plaza MD  Last physical: 11/27/2020 Last MTM visit: Visit date not found   Next visit within 3 mo: Visit date not found  Next physical within 3 mo: Visit date not found  Prescriber OR PCP: Christine Plaza MD  Last diagnosis associated with med order: 1. Mixed obsessional thoughts and acts  - sertraline (ZOLOFT) 50 MG tablet; Take 1 tablet (50 mg total) by mouth daily.  Dispense: 90 tablet; Refill: 0    If protocol passes may refill for 12 months if within 3 months of last provider visit (or a total of 15 months).

## 2021-06-14 NOTE — TELEPHONE ENCOUNTER
Left message to call back for: leigh  Information to relay to patient: schedule iud with Karthik.

## 2021-06-15 NOTE — TELEPHONE ENCOUNTER
Left message to call back for: Pt  Information to relay to patient:  Please help schedule IUD with Karthik for pt when she call back. xl

## 2021-06-16 PROBLEM — K22.4 ESOPHAGEAL DYSMOTILITY: Status: ACTIVE | Noted: 2020-11-26

## 2021-06-16 PROBLEM — F50.812 SEVERE BINGE-EATING DISORDER: Status: ACTIVE | Noted: 2018-07-17

## 2021-06-16 PROBLEM — K44.9 DIAPHRAGMATIC HERNIA: Status: ACTIVE | Noted: 2018-04-23

## 2021-06-16 PROBLEM — F45.8 BRUXISM: Status: ACTIVE | Noted: 2020-09-25

## 2021-06-16 PROBLEM — Z00.00 ENCOUNTER FOR PREVENTIVE CARE: Status: ACTIVE | Noted: 2020-09-25

## 2021-06-16 PROBLEM — F42.9 OCD (OBSESSIVE COMPULSIVE DISORDER): Chronic | Status: ACTIVE | Noted: 2017-07-20

## 2021-06-16 PROBLEM — Z30.8 ENCOUNTER FOR OTHER CONTRACEPTIVE MANAGEMENT: Status: ACTIVE | Noted: 2021-01-27

## 2021-06-16 PROBLEM — R21 BUTTERFLY RASH: Status: ACTIVE | Noted: 2020-10-29

## 2021-06-16 PROBLEM — G43.009 MIGRAINE WITHOUT AURA AND WITHOUT STATUS MIGRAINOSUS, NOT INTRACTABLE: Status: ACTIVE | Noted: 2019-08-08

## 2021-06-16 PROBLEM — K31.7 POLYP OF DUODENUM: Status: ACTIVE | Noted: 2020-10-19

## 2021-06-16 NOTE — TELEPHONE ENCOUNTER
Telephone Encounter by Kusum Ch CMA at 8/7/2019  7:39 AM     Author: Kusum Ch CMA Service: -- Author Type: Medical Assistant    Filed: 8/7/2019  7:39 AM Encounter Date: 8/7/2019 Status: Signed    : Kusum Ch CMA (Medical Assistant)       Christine Plaza MD P [place], [facility] Care Team Pool             Pt recently in er for migraine.  I noted she has me listed as pcp, but I have never seen her for an annual exam (I think I have only seen her for weight loss).     Please confirm who her pcp is and if she wants me to be pcp, she needs an annual exam/ hm visit. It appears she is due for a pap etc.

## 2021-06-17 NOTE — TELEPHONE ENCOUNTER
Telephone Encounter by Radha Barrett at 10/5/2020 11:50 AM     Author: Radha Barrett Service: -- Author Type: --    Filed: 10/5/2020 11:51 AM Encounter Date: 9/30/2020 Status: Signed    : Radha Barrett APPROVED:    Approval start date: 9/1/2020  Approval end date:  12/30/2020    Pharmacy has been notified of approval and will contact patient when medication is ready for pickup.

## 2021-06-18 NOTE — PROGRESS NOTES
Assessment:     1. Sinusitis  amoxicillin-clavulanate (AUGMENTIN) 875-125 mg per tablet    albuterol (PROAIR HFA;PROVENTIL HFA;VENTOLIN HFA) 90 mcg/actuation inhaler          Plan:     Sinusitis  Treatment as above.  Symptomatic care discussed.  Side effects and precautions of medications discussed.  Follow up in clinic in 5-7 days if not improving sooner for any signs or symptoms as discussed.    Reflux  Patient does have a appointment with GI for further testing.  She is on Protonix.  Her weight has gone back up above her panic weight.  Patient encouraged to follow-up with Dr. Rocha to help get back on track for weight loss.      Subjective:       32 y.o. female presents for evaluation cough for the past 3 weeks.  She did go to the emergency care when she had ear pain, she has been back on Sudafed which has helped mildly.  Cough is worse in the morning from drainage down the back of her throat.  She is on Protonix 40 mg twice a day and currently undergoing further workup for her reflux with GI.  Reflux was under better control when her weight was lower.  However the past 2 months her weight has come back up.  This mainly because with the cough in the ill feeling that she has not been exercising as much and has not been watching her diet as closely.  She does feel chills occasionally but no fevers.  Denies any changes in vision.  Mild pressure behind the eyes but not her typical sinus pressure.  Denies any wheezing.  She did have some Tessalon Perles from her previous cough she had 4 months ago, those do not seem to be helping as much.    The following portions of the patient's history were reviewed and updated as appropriate: allergies, current medications, past social history and problem list.    Review of Systems  Pertinent items are noted in HPI.     Objective:     Vitals:    06/04/18 0913   BP: 114/68   Pulse: 72   Resp: 12   Temp: 98.3  F (36.8  C)   SpO2: 98%     General: Alert and oriented ×3 no acute  distress.  Head: Normocephalic, sinuses tender to palpation  Oropharynx: Mild erythema and postnasal drip, no exudate or ulcerations  Eyes: EOMI, PERRLA  Nose: Turbinates erythematous, edematous with moderate rhinorrhea  Neck: Supple, full range of motion, no lymphadenopathy  Lungs: Good auscultation bilateral  Heart: Regular rate and rhythm without murmur rub or gallop  Pulses: 2+ symmetrical in distal extremities ×4      This note has been dictated using voice recognition software. Any grammatical or context distortions are unintentional and inherent to the software

## 2021-06-19 NOTE — PROGRESS NOTES
ASSESSMENT AND PLAN:     Problem List Items Addressed This Visit        Unprioritized    Chronic Reflux Esophagitis (Chronic)     4/17/2018 she saw gastroenterology and they thought initially that she has GERD versus eosinophilic esophagitis.  She was prescribed Nexium. Then, her EGD was noted to be normal and they said she had no GERD, Arrington's or eosinophilic esophagitis after the EGD was available. GI recommened a ph study which then showed pretty severe gerd so she is taking protonix jtwice daily.  She is considered a candidate for a nissen and or a new procedure with a magnet to tighten the lower esophageal sphincter. .  Weight loss is still recommended to help her overall health and these sx. See bmi in problem list.          Class 1 drug-induced obesity with serious comorbidity  BMI - HI 34, LOW 28     Dx: BMI 34.63, in the setting of multiple weight related comorbid conditions including: Chronic reflux esophagitis, severe binge eating disorder, and vitamin D deficiency     Initial Weight: 208.1 lbs bmi 34.63, 7/17/2018 (restart)  Weight: 208 lb 1.6 oz 7/17/2018   Weight: 206 lb (93.4 kg) bmi 34.28 8/15/2018   Weight loss from initial: 2.1  % Weight loss: 1.01 %  Body Fat %: 44.2         Are you experiencing any side effects to the medications:  no  Hunger control: Poor. Daily tendency to want to binge  Exercise was discussed: She is regularly exercising.  Taking supplements: Not discussed due to time constraints.  Discussed journaling food: She is doing this intermittently.  Patient is pleased with the current results: No  The patient is following the nutrition plan: Yes during the day but in the evenings she is binging and that is making it so that which she does during the day is not helpful  Barriers to losing weight:    lexapro -she has gained 30 pounds since starting Lexapro in July 2017 -this was discontinued as of July 2018.     PLAN  LOST WEIGHT, REGAINED AND NOW ACTIVELY LOSING AGAIN     Labs were  reviewed today and all appear normal.  Does not appear that she has prediabetes metabolic syndrome or thyroid abnormalities.    She did get off the lexapro and started the zoloft and it is going well.  She feels her mood is good.  She is not happy with her appetite control and feels like she needs to work really hard to avoid binging on a daily basis.  We discussed a trial of Wellbutrin.  She would like to start that.  I sent over prescription for her today.  She will take the Zoloft in the evenings and Wellbutrin in the mornings.  She says she has no family or personal history of seizure disorder.  We did discuss risks and benefits and at this time she would like to give the Wellbutrin shot.  Because of her eating disorder I have recommended that she visit the [facility] to address this.  Today she says she will make an intake an appointment with them.    In the future we could consider Contrave or Vyvanse or N-acetylcysteine.  I want to keep in mind that she did try phentermine in the past and has mixed results with that so I am not sure what Vyvanse or repeat course of phentermine would be helpful.     LEXAPRO 20 mg orally per day -anxiety and obsessive-compulsive disorder  7/2017-7/2018-weight gain of 30 pounds  Dc 7/17/2018      ZOLOFT 50 mg orally per day-anxiety and OCD  7/17/2018 - 8/15/2018 - doing well feels this is working. - will continue    WELLBUTRIN 150 XL DAILY   8/15/2018 - START       PHENTERMINE 37.5 MG PO Q DAY - For weight loss  4/19/2016-9/2016-initially lost 15 pounds but then even though she was still taking the phentermine she gained 7 pounds.  9/2016-12/2016 she did not take phentermine and gained 10 pounds during this time  1/2017 -4/2017 -she did not take phentermine and lost 15 pounds  4/2017 - 11/2017 -weight was stable and went up and down 5 pounds despite the use of phentermine during this time  11/2017 -7/2018-gained 30 pounds probably due to use of Lexapro during this  time  7/2018-we will consider use of phentermine again in the future but at this time I would like to start Zoloft and discontinue Lexapro to see how she does once she stabilized on the Zoloft we can consider adding or changing medications.     Follow up in one month.                Chief Complaint   Patient presents with     Weight Loss        HPI  Mary Manuel is a 32 y.o. female comes in today saying that she is significantly improved her eating and feels like she is working really hard to control her binging.  Unfortunately she still has problems.  She feels like her body image is terrible.    Since we met last time she did discontinue Lexapro and start the Zoloft and feels like that is working really well for her anxiety and OCD but she still has body image concerns and thinks that she does have an eating disorder because she is wanting to overeat daily.    History   Smoking Status     Never Smoker   Smokeless Tobacco     Never Used      Current Outpatient Prescriptions on File Prior to Visit   Medication Sig Dispense Refill     cholecalciferol, vitamin D3, 1,000 unit capsule Take 1 capsule by mouth daily.       JUNEL 1/20, 21, 1-20 mg-mcg per tablet Take 1 tablet by mouth daily.       magnesium 250 mg Tab Take 1 tablet by mouth daily.       pantoprazole (PROTONIX) 40 MG tablet TAKE 1 TABLET (40 MG TOTAL) BY MOUTH DAILY. 90 tablet 2     sertraline (ZOLOFT) 50 MG tablet Take 1 tablet (50 mg total) by mouth daily. 90 tablet 3     No current facility-administered medications on file prior to visit.       Allergies   Allergen Reactions     Amoxicillin-Pot Clavulanate Nausea And Vomiting and Swelling     Ciprofloxacin      Potassium Clavulanate Other (See Comments)     Sulfamethoxazole-Trimethoprim      Review of Systems   Constitutional: Negative.    HENT: Negative.    Eyes: Negative.    Respiratory: Negative.    Cardiovascular: Negative.    Gastrointestinal: Negative.    Endocrine: Negative.   "  Genitourinary: Negative.    Musculoskeletal: Negative.    Skin: Negative.    Neurological: Negative.    Hematological: Negative.    Psychiatric/Behavioral: Negative.         OBJECTIVE: /76 (Patient Site: Left Arm, Patient Position: Sitting, Cuff Size: Adult Regular)  Pulse 70  Temp 98.3  F (36.8  C) (Oral)   Resp 14  Ht 5' 5\" (1.651 m)  Wt 206 lb (93.4 kg)  SpO2 98%  Breastfeeding? No  BMI 34.28 kg/m2   Physical Exam   Constitutional: She is oriented to person, place, and time. She appears well-developed and well-nourished. No distress.   HENT:   Head: Normocephalic and atraumatic.   Eyes: Conjunctivae are normal.   Neck: Neck supple.   Cardiovascular: Normal rate and regular rhythm.    Pulmonary/Chest: Effort normal.   Musculoskeletal: Normal range of motion.   Neurological: She is alert and oriented to person, place, and time.   Skin: Skin is warm and dry.   Psychiatric: She has a normal mood and affect.        This note was created using Dragon dictation.  Please excuse any grammatical errors.    "

## 2021-06-19 NOTE — PROGRESS NOTES
ASSESSMENT AND PLAN:    We spent 50 minutes today in direct patient contact, 100% of the time in consultation concerning medical problems as listed below, and in review of chart and what has and has not worked for her in the past for her weight.      Problem List Items Addressed This Visit        Unprioritized    Class 1 drug-induced obesity with serious comorbidity  BMI - HI 34, LOW 28     Dx: BMI 34.63, in the setting of multiple weight related comorbid conditions including: Chronic reflux esophagitis, severe binge eating disorder, and vitamin D deficiency    Initial Weight: 208.1 lbs bmi 34.63, 7/17/2018 (restart)  Weight: 208 lb 1.6 oz 7/17/2018   Weight loss from initial: 0  % Weight loss: 0 %  Body Fat %: 44.2       Are you experiencing any side effects to the medications:  Yes gaining weight since starting lexapro 7/2017.   Hunger control: Poor.  She says that every night at 9 PM she is eating everything she can find -binging nightly.  She says she will eat more than 1000 cass in the evenings.  Otherwise she is actually eating fairly well.  Exercise was discussed: She is regularly exercising.  Taking supplements: Not discussed due to time constraints.  Discussed journaling food: She is doing this intermittently.  Patient is pleased with the current results: No  The patient is following the nutrition plan: Yes during the day but in the evenings she is binging and that is making it so that which she does during the day is not helpful  Barriers to losing weight:    lexapro -she has gained 30 pounds since starting Lexapro in July 2017.    PLAN    Mary tells me that despite trying really hard to eat healthy and exercising she continues to have difficulty with her weight.  Got to the point that she feels she can no longer control it at all.  She notes that every night she is eating whatever she can get her hands on and sometimes this is more than her daily need for calories.  Because of this we have a new  diagnosis of binge eating disorder today.    I plan for her is to get her off the Lexapro because I think the Lexapro is causing significant weight gain.  She has gained 30 pounds since she started it.  However, she continues to have anxiety and OCD type symptoms that are improved by the Lexapro so I have recommended we start Zoloft instead.  She is to start the Zoloft tomorrow evening at 25 mg and increase as tolerated to 50 mg daily.  We will visit again in 1 month and if she is stable on the Lexapro we might consider adding Wellbutrin, Vyvanse, and or N-acetylcysteine to this regimen.  We could also use phentermine but she has had mixed results with that in the past.    Because of the significant weight gain of 30 pounds I have recommended also re-evaluating laboratory studies to look for thyroid abnormalities, metabolic syndrome and prediabetes.    LEXAPRO 20 mg orally per day -anxiety and obsessive-compulsive disorder  7/2017-7/2018-weight gain of 30 pounds  Dc 7/17/2018     ZOLOFT 25 mg orally per day-anxiety and OCD may increase to 50 mg after the first week  I would like to use this medication instead of Lexapro due to weight gain on Lexapro.    PHENTERMINE 37.5 MG PO Q DAY - For weight loss  4/19/2016-9/2016-initially lost 15 pounds but then even though she was still taking the phentermine she gained 7 pounds.  9/2016-12/2016 she did not take phentermine and gained 10 pounds during this time  1/2017 -4/2017 -she did not take phentermine and lost 15 pounds  4/2017 - 11/2017 -weight was stable and went up and down 5 pounds despite the use of phentermine during this time  11/2017 -7/2018-gained 30 pounds probably due to use of Lexapro during this time  7/2018-we will consider use of phentermine again in the future but at this time I would like to start Zoloft and discontinue Lexapro to see how she does once she stabilized on the Zoloft we can consider adding or changing medications.    Follow up in one  month.         Vitamin D deficiency - Primary    Relevant Orders    Vitamin D, Total (25-Hydroxy)    Severe binge-eating disorder     LEXAPRO 20 mg orally per day -anxiety and obsessive-compulsive disorder  7/2017-7/2018-weight gain of 30 pounds  Dc 7/17/2018     ZOLOFT 25 mg orally per day-anxiety and OCD may increase to 50 mg after the first week  I would like to use this medication instead of Lexapro due to weight gain on Lexapro.  Left also has a known indication for binge eating disorder.    Eventually I might like to add Vyvanse which is also known to be helpful for binge eating disorder.         Relevant Medications    sertraline (ZOLOFT) 25 MG tablet      Other Visit Diagnoses     Screening for metabolic disorder        Relevant Orders    Comprehensive Metabolic Panel    Screening, anemia, deficiency, iron        Relevant Orders    HM1(CBC and Differential)    Screening for thyroid disorder        Relevant Orders    Thyroid [place]    Screening for diabetes mellitus        Relevant Orders    Glycosylated Hemoglobin A1c    Screening, lipid        Relevant Orders    Lipid [place]           Chief Complaint   Patient presents with     Weight Loss Intake     Restart        HPI  Mary Manuel is a 32 y.o. female comes in today feeling really bad because she has gained a significant amount of weight since the last time I saw her.  In the past she has been in her weight loss program and use phentermine on and off with mixed results.  She notes that her weight gain really started to take off once she started Lexapro.  She is trying to eat healthy and exercise and she is doing a fairly good job until the evening times.  She says every day around 9 PM she finds herself eating everything she can find she feels really out of control about this and has a feeling that this is pathologic.    History   Smoking Status     Never Smoker   Smokeless Tobacco     Never Used      Current Outpatient Prescriptions on File Prior  to Visit   Medication Sig Dispense Refill     cholecalciferol, vitamin D3, 1,000 unit capsule Take 1 capsule by mouth daily.       JUNEL 1/20, 21, 1-20 mg-mcg per tablet Take 1 tablet by mouth daily.       magnesium 250 mg Tab Take 1 tablet by mouth daily.       pantoprazole (PROTONIX) 40 MG tablet TAKE 1 TABLET (40 MG TOTAL) BY MOUTH DAILY. 90 tablet 2     [DISCONTINUED] escitalopram oxalate (LEXAPRO) 20 MG tablet TAKE 1 TABLET (20 MG TOTAL) BY MOUTH DAILY. 90 tablet 3     [DISCONTINUED] albuterol (PROAIR HFA;PROVENTIL HFA;VENTOLIN HFA) 90 mcg/actuation inhaler Inhale 2 puffs every 6 (six) hours as needed for wheezing. 1 each 0     [DISCONTINUED] alpha lipoic acid 100 mg cap Take 1 capsule by mouth daily.       [DISCONTINUED] azithromycin (ZITHROMAX Z-MELI) 250 MG tablet Take 2 tablets (500 mg) on  Day 1,  followed by 1 tablet (250 mg) once daily on Days 2 through 5. 6 tablet 0     [DISCONTINUED] biotin 300 mcg Tab Take 1 tablet by mouth daily.       [DISCONTINUED] blue-green algae, bulk, (SPIRULINA) Powd Take 1 Dose by mouth daily.       [DISCONTINUED] omega 3-dha-epa-fish oil (FISH OIL) 1,000 mg (120 mg-180 mg) cap Take 1 capsule by mouth daily.       [DISCONTINUED] resveratrol 50 mg cap Take 1 capsule by mouth daily.       [DISCONTINUED] turmeric root extract 500 mg cap Take 1 capsule by mouth daily.       [DISCONTINUED] zinc 50 mg Tab Take 1 tablet by mouth daily.       No current facility-administered medications on file prior to visit.       Allergies   Allergen Reactions     Amoxicillin-Pot Clavulanate Nausea And Vomiting and Swelling     Ciprofloxacin      Sulfamethoxazole-Trimethoprim      Review of Systems   Constitutional: Negative.    HENT: Negative.    Eyes: Negative.    Respiratory: Negative.    Cardiovascular: Negative.    Gastrointestinal: Negative.    Endocrine: Negative.    Genitourinary: Negative.    Musculoskeletal: Negative.    Skin: Negative.    Neurological: Negative.    Hematological:  "Negative.    Psychiatric/Behavioral: Negative.         OBJECTIVE: /80  Pulse 68  Resp 12  Ht 5' 5\" (1.651 m)  Wt 208 lb 1.6 oz (94.4 kg)  LMP 07/01/2018 (Approximate)  Breastfeeding? No  BMI 34.63 kg/m2   Physical Exam   Constitutional: She is oriented to person, place, and time. She appears well-developed and well-nourished. No distress.   HENT:   Head: Normocephalic and atraumatic.   Eyes: Conjunctivae are normal.   Neck: Neck supple.   Cardiovascular: Normal rate and regular rhythm.    Pulmonary/Chest: Effort normal.   Musculoskeletal: Normal range of motion.   Neurological: She is alert and oriented to person, place, and time.   Skin: Skin is warm and dry.   Psychiatric: She has a normal mood and affect.        This note was created using Dragon dictation.  Please excuse any grammatical errors.    "

## 2021-06-20 NOTE — PROGRESS NOTES
ASSESSMENT AND PLAN:     Problem List Items Addressed This Visit        Unprioritized    Class 1 drug-induced obesity with serious comorbidity  BMI - HI 34, LOW 28     Dx: BMI 34.63, in the setting of multiple weight related comorbid conditions including: Chronic reflux esophagitis, severe binge eating disorder, and vitamin D deficiency      Initial Weight: 208.1 lbs bmi 34.63, 7/17/2018 (restart)  Weight: 208 lb 1.6 oz 7/17/2018   Weight: 206 lb (93.4 kg) bmi 34.28 8/15/2018   Weight: 203 lb (92.1 kg) bmi 33.78 9/12/2018   Weight loss from initial: 5.1  % Weight loss: 2.45 %           Are you experiencing any side effects to the medications:  no  Hunger control: Poor. Daily tendency to want to binge  Exercise was discussed: She is regularly exercising.  Taking supplements: Not discussed due to time constraints.  Discussed journaling food: She is doing this intermittently.  Patient is pleased with the current results: No  The patient is following the nutrition plan: Yes she is using an mikael called [employer] and finds it motivational and helpful to stay on track with eating.   Barriers to losing weight:    lexapro -she has gained 30 pounds since starting Lexapro in July 2017 -this was discontinued as of July 2018.      PLAN  LOST WEIGHT, REGAINED AND NOW ACTIVELY LOSING AGAIN     CAP plan discussed today - plan to reset once she loses more weight.  Less than or equal to 208 pounds-control  209 pounds- action  210 pounds-panic    She is still liking the zoloft and wants to continue that. She feels like the combination of Zoloft and Wellbutrin is really perfect for her.  She says she has more patience with her kids, less agitation overall, she feels an overall sense of well-being and has been sleeping better.  She does not even feel like she is eating as much in the evenings anymore.  She is able to fast for 12 hours overnight.    She notes that she does not get enough sleep.  Currently she gets 6 hours of sleep most.  Her  goal is to start to increase this slowly.  By next time she is hoping she can try to get 6hrs and 15 minutes of sleep per night and she plans to track this with her Fitbit.    She still feels like she has some disordered eating and is still open to the idea of going to the [facility] program to address this.  However, she is not yet ready.      In the future we could consider Contrave or Vyvanse or N-acetylcysteine.  I want to keep in mind that she did try phentermine in the past and has mixed results with that so I am not sure what Vyvanse or repeat course of phentermine would be helpful.      LEXAPRO 20 mg orally per day -anxiety and obsessive-compulsive disorder  7/2017-7/2018-weight gain of 30 pounds  Dc 7/17/2018       ZOLOFT 50 mg orally per day-anxiety and OCD  7/17/2018 - 9/12/2018  - doing well feels this is working. - will continue     WELLBUTRIN 150 XL DAILY   8/15/2018 - 9/12/2018 -  works really well, weight down and she feels her appetite is much better in the evenings and her mood is much improved.       PHENTERMINE 37.5 MG PO Q DAY - For weight loss  4/19/2016-9/2016-initially lost 15 pounds but then even though she was still taking the phentermine she gained 7 pounds.  9/2016-12/2016 she did not take phentermine and gained 10 pounds during this time  1/2017 -4/2017 -she did not take phentermine and lost 15 pounds  4/2017 - 11/2017 -weight was stable and went up and down 5 pounds despite the use of phentermine during this time  11/2017 -7/2018-gained 30 pounds probably due to use of Lexapro during this time  7/2018-we will consider use of phentermine again in the future but at this time I would like to start Zoloft and discontinue Lexapro to see how she does once she stabilized on the Zoloft we can consider adding or changing medications.      Follow up in one month.                Chief Complaint   Patient presents with     Weight Loss        HPI  Mary PISANO Estradanhung is a 32 y.o. female comes in for  "weight loss follow up. Really liking wellbutrin and zoloft combination and feels they help, she wants to continue these.     History   Smoking Status     Never Smoker   Smokeless Tobacco     Never Used      Current Outpatient Prescriptions on File Prior to Visit   Medication Sig Dispense Refill     buPROPion (WELLBUTRIN XL) 150 MG 24 hr tablet Take 1 tablet (150 mg total) by mouth every morning. 90 tablet 0     cholecalciferol, vitamin D3, 1,000 unit capsule Take 1 capsule by mouth daily.       JUNEL 1/20, 21, 1-20 mg-mcg per tablet Take 1 tablet by mouth daily.       magnesium 250 mg Tab Take 1 tablet by mouth daily.       pantoprazole (PROTONIX) 40 MG tablet TAKE 1 TABLET (40 MG TOTAL) BY MOUTH DAILY. 90 tablet 2     sertraline (ZOLOFT) 50 MG tablet Take 1 tablet (50 mg total) by mouth daily. 90 tablet 3     No current facility-administered medications on file prior to visit.       Allergies   Allergen Reactions     Amoxicillin-Pot Clavulanate Nausea And Vomiting and Swelling     Ciprofloxacin      Potassium Clavulanate Other (See Comments)     Sulfamethoxazole-Trimethoprim        Review of Systems   Constitutional: Negative.    HENT: Negative.    Eyes: Negative.    Respiratory: Negative.    Cardiovascular: Negative.    Gastrointestinal: Negative.    Endocrine: Negative.    Genitourinary: Negative.    Musculoskeletal: Negative.    Skin: Negative.    Neurological: Negative.    Hematological: Negative.    Psychiatric/Behavioral: Negative.         OBJECTIVE: /74  Pulse 72  Resp 14  Ht 5' 5\" (1.651 m)  Wt 203 lb (92.1 kg)  BMI 33.78 kg/m2   Physical Exam   Constitutional: She is oriented to person, place, and time. She appears well-developed and well-nourished. No distress.   HENT:   Head: Normocephalic and atraumatic.   Eyes: Conjunctivae are normal.   Neck: Neck supple.   Cardiovascular: Normal rate and regular rhythm.    Pulmonary/Chest: Effort normal.   Musculoskeletal: Normal range of motion. "   Neurological: She is alert and oriented to person, place, and time.   Skin: Skin is warm and dry.   Psychiatric: She has a normal mood and affect.        This note was created using Dragon dictation.  Please excuse any grammatical errors.

## 2021-06-22 NOTE — PROGRESS NOTES
ASSESSMENT AND PLAN:     Problem List Items Addressed This Visit        Unprioritized    Class 1 drug-induced obesity with serious comorbidity and body mass index (BMI) of 33.0 to 33.9 in adult     Dx: BMI 34.63, in the setting of multiple weight related comorbid conditions including: Chronic reflux esophagitis, severe binge eating disorder, and vitamin D deficiency      Initial Weight: 208.1 lbs bmi 34.63, 7/17/2018 (restart)  Weight: 208 lb 1.6 oz 7/17/2018   Weight: 206 lb (93.4 kg) bmi 34.28 8/15/2018   Weight: 203 lb (92.1 kg) bmi 33.78 9/12/2018   Weight: 200 lb 14.4 oz (91.1 kg) bmi 33.43, 11/29/2018   Weight loss from initial: 7.2  % Weight loss: 3.46 %    LEXAPRO 20 mg orally per day -anxiety and obsessive-compulsive disorder  7/2017-7/2018-weight gain of 30 pounds  Dc 7/17/2018       ZOLOFT 50 mg orally per day-anxiety and OCD  7/17/2018 - 11/29/2018  - doing well feels this is working. - will continue     WELLBUTRIN 150 XL DAILY   8/15/2018 - 11/29/2018 -  works really well, weight down and she feels her appetite is much better in the evenings and her mood is much improved.       PHENTERMINE 37.5 MG PO Q DAY - For weight loss  4/19/2016-9/2016-initially lost 15 pounds but then even though she was still taking the phentermine she gained 7 pounds.  9/2016-12/2016 she did not take phentermine and gained 10 pounds during this time  1/2017 -4/2017 -she did not take phentermine and lost 15 pounds  4/2017 - 11/2017 -weight was stable and went up and down 5 pounds despite the use of phentermine during this time  11/2017 -7/2018-gained 30 pounds probably due to use of Lexapro during this time  7/2018-we will consider use of phentermine again in the future but at this time I would like to start Zoloft and discontinue Lexapro to see how she does once she stabilized on the Zoloft we can consider adding or changing medications.                PLAN  WEIGHT LOSS PHASE - LOST WEIGHT, REGAINED AND NOW ACTIVELY LOSING  AGAIN      CAP PLAN IMPLEMENTED  Less than or equal to 202 lbs- control  Between 203-205 lbs - action  Greater than or equal to 206 - panic     Continue zoloft and wellbutrin.      In the future we could consider Contrave or Vyvanse or N-acetylcysteine or belviq instead of zoloft.  I want to keep in mind that she did try phentermine in the past and has mixed results with that so I am not sure what Vyvanse or repeat course of phentermine would be helpful.    FOLLOW UP IN ONE MONTH         Goiter, nodular     Ultrasound for follow-up is ordered.  She has not seen the endocrinologist, Dr. Harris since September 2016 and so follow-up was ordered as well.         Relevant Orders    US Thyroid    Ambulatory referral to Endocrinology      Other Visit Diagnoses     Need for vaccination    -  Primary    Relevant Orders    Influenza, Seasonal Quad, Preservative Free 36+ Months           Chief Complaint   Patient presents with     Follow-up     Weight Loss     Flu Vaccine        HPI  Mary Manuel is a 33 y.o. female is here for weight loss follow up.  And to follow-up on her goiter.    She says she went to the [facility] program and had an assessment.  She felt they did a very thorough job and she was there for about 3 hours.  They told her that she does not have an eating disorder and does not require treatment.    Social History     Tobacco Use   Smoking Status Never Smoker   Smokeless Tobacco Never Used      Current Outpatient Medications on File Prior to Visit   Medication Sig Dispense Refill     buPROPion (WELLBUTRIN XL) 150 MG 24 hr tablet Take 1 tablet (150 mg total) by mouth every morning. 90 tablet 3     cholecalciferol, vitamin D3, 1,000 unit capsule Take 1 capsule by mouth daily.       JUNEL 1/20, 21, 1-20 mg-mcg per tablet Take 1 tablet by mouth daily.       magnesium 250 mg Tab Take 1 tablet by mouth daily.       pantoprazole (PROTONIX) 40 MG tablet TAKE 1 TABLET (40 MG TOTAL) BY MOUTH DAILY. 90 tablet 2      "sertraline (ZOLOFT) 50 MG tablet Take 1 tablet (50 mg total) by mouth daily. 90 tablet 3     No current facility-administered medications on file prior to visit.       Allergies   Allergen Reactions     Amoxicillin-Pot Clavulanate Nausea And Vomiting and Swelling     Ciprofloxacin      Potassium Clavulanate Other (See Comments)     Sulfamethoxazole-Trimethoprim      Review of Systems   Constitutional: Negative.    HENT: Negative.    Eyes: Negative.    Respiratory: Negative.    Cardiovascular: Negative.    Gastrointestinal: Negative.    Endocrine: Negative.    Genitourinary: Negative.    Musculoskeletal: Negative.    Skin: Negative.    Neurological: Negative.    Hematological: Negative.    Psychiatric/Behavioral: Negative.         OBJECTIVE: /64 (Patient Site: Left Arm, Patient Position: Sitting, Cuff Size: Adult Large)   Pulse 64   Temp 98.4  F (36.9  C) (Oral)   Resp 12   Ht 5' 5\" (1.651 m)   Wt 200 lb 14.4 oz (91.1 kg)   LMP 11/19/2018 (Exact Date)   Breastfeeding? No   BMI 33.43 kg/m     Physical Exam   Constitutional: She is oriented to person, place, and time. She appears well-developed and well-nourished. No distress.   HENT:   Head: Normocephalic and atraumatic.   Eyes: Conjunctivae are normal.   Neck: Neck supple.   Cardiovascular: Normal rate and regular rhythm.   Pulmonary/Chest: Effort normal.   Musculoskeletal: Normal range of motion.   Neurological: She is alert and oriented to person, place, and time.   Skin: Skin is warm and dry.   Psychiatric: She has a normal mood and affect.     Orders Placed This Encounter   Procedures     US Thyroid     Influenza, Seasonal Quad, Preservative Free 36+ Months     Ambulatory referral to Endocrinology         This note was created using Dragon dictation.  Please excuse any grammatical errors.    "

## 2021-06-23 NOTE — PROGRESS NOTES
ASSESSMENT AND PLAN:     Problem List Items Addressed This Visit        Unprioritized    Chronic Reflux Esophagitis (Chronic)     She tells me that her GERD is worsening.  She is now taking her Protonix twice a day instead of once a day and is considering a surgical procedure for her GERD.  Her gastroenterologist has advised at least 20 pound weight loss.         Class 1 drug-induced obesity with serious comorbidity and body mass index (BMI) of 33.0 to 33.9 in adult     Dx: BMI 34.63, in the setting of multiple weight related comorbid conditions including: Chronic reflux esophagitis, severe binge eating disorder, and vitamin D deficiency      Initial Weight: 208.1 lbs bmi 34.63, 7/17/2018 (restart)  Weight: 208 lb 1.6 oz 7/17/2018   Weight: 206 lb (93.4 kg) bmi 34.28 8/15/2018   Weight: 203 lb (92.1 kg) bmi 33.78 9/12/2018   Weight: 200 lb 14.4 oz (91.1 kg) bmi 33.43, 11/29/2018   Weight: 201 lb (91.2 kg) bmi 33.45, 1/16/2019   Weight loss from initial: 7.1  % Weight loss: 3.41 %       LEXAPRO 20 mg orally per day -anxiety and obsessive-compulsive disorder  7/2017-7/2018-weight gain of 30 pounds  Dc 7/17/2018       ZOLOFT 50 mg orally per day-anxiety and OCD  7/17/2018 - 1/16/2019   - doing well feels this is working. - will continue     WELLBUTRIN 150 XL DAILY   8/15/2018 - 1/16/2019 -  works really well, weight down and she feels her appetite is much better in the evenings and her mood is much improved.       PHENTERMINE 37.5 MG PO Q DAY - For weight loss  4/19/2016-9/2016-initially lost 15 pounds but then even though she was still taking the phentermine she gained 7 pounds.  9/2016-12/2016 she did not take phentermine and gained 10 pounds during this time  1/2017 -4/2017 -she did not take phentermine and lost 15 pounds  4/2017 - 11/2017 -weight was stable and went up and down 5 pounds despite the use of phentermine during this time  11/2017 -7/2018-gained 30 pounds probably due to use of Lexapro during this  time  7/2018-we will consider use of phentermine again in the future but at this time I would like to start Zoloft and discontinue Lexapro to see how she does once she stabilized on the Zoloft we can consider adding or changing medications.    saxenda   1/16/2019 - discussed see notes below from this date.        PLAN  WEIGHT MAINTENANCE PHASE     CAP PLAN IMPLEMENTED   Less than or equal to 203 lbs- control  Between 204-206 lbs - action  Greater than or equal to 207 - panic      Continue zoloft and wellbutrin.   Discussed saxenda briefly and she is interested especially because her GERD is really becoming problematic and she is starting to consider surgical intervention for it.-I did ask her to check with her endocrinologist because she has been watching that goiter and they want to make sure that this would not be contraindicated, also she is to check on the website and specifically look at cost and insurance coverage and risks/benefits.  If all of this checks out on her and we can proceed and discuss this further at her next visit.    She tells me gerd is worsening and she now takes gerd two times a day through GI doc. They are considering a surgical intervention.      In the future we could consider Contrave or Vyvanse or N-acetylcysteine or belviq instead of zoloft.  I want to keep in mind that she did try phentermine in the past and has mixed results with that so I am not sure what Vyvanse or repeat course of phentermine would be helpful.     FOLLOW UP IN ONE MONTH                Chief Complaint   Patient presents with     Weight Loss        HPI  Mary Manuel is a 33 y.o. female comes in for weight loss follow up.      Update on gerd: She says her symptoms are worsening and she is needing to increase her PPI as well as now considering surgical intervention.    Are you experiencing any side effects to the medications:   No she likes the Zoloft and Wellbutrin and would like to continue them.  Hunger  control:   She has been getting off track with her diet she finds herself eating sweets and foods that she does not want to eat but is compulsively eating.  Exercise was discussed: She has not been exercising but would like to get back to this eventually.  Taking supplements:   Not discussed today due to time constraints.  Discussed journaling food:   She is not journaling, but she did find it useful in the past and would like to get back to that.  She said she might consider this now.  Patient is pleased with the current results:   She is happy that she was able to basically maintain over the holidays because it was a really difficult time for her in terms of food.  The patient is following the nutrition plan:   No, but she is trying to get back to it.  Barriers to losing weight:   Hunger.     Social History     Tobacco Use   Smoking Status Never Smoker   Smokeless Tobacco Never Used      Current Outpatient Medications on File Prior to Visit   Medication Sig Dispense Refill     buPROPion (WELLBUTRIN XL) 150 MG 24 hr tablet Take 1 tablet (150 mg total) by mouth every morning. 90 tablet 3     cholecalciferol, vitamin D3, 1,000 unit capsule Take 1 capsule by mouth daily.       JUNEL 1/20, 21, 1-20 mg-mcg per tablet Take 1 tablet by mouth daily.       magnesium 250 mg Tab Take 1 tablet by mouth daily.       pantoprazole (PROTONIX) 40 MG tablet TAKE 1 TABLET (40 MG TOTAL) BY MOUTH DAILY. 90 tablet 2     sertraline (ZOLOFT) 50 MG tablet Take 1 tablet (50 mg total) by mouth daily. 90 tablet 3     No current facility-administered medications on file prior to visit.       Allergies   Allergen Reactions     Amoxicillin-Pot Clavulanate Nausea And Vomiting and Swelling     Ciprofloxacin      Potassium Clavulanate Other (See Comments)     Sulfamethoxazole-Trimethoprim          Review of Systems   Constitutional: Negative.    HENT: Negative.    Eyes: Negative.    Respiratory: Negative.    Cardiovascular: Negative.   "  Gastrointestinal: Negative.    Endocrine: Negative.    Genitourinary: Negative.    Musculoskeletal: Negative.    Skin: Negative.    Neurological: Negative.    Hematological: Negative.    Psychiatric/Behavioral: Negative.         OBJECTIVE: /84   Pulse 84   Resp 16   Ht 5' 5\" (1.651 m)   Wt 201 lb (91.2 kg)   BMI 33.45 kg/m     Physical Exam   Constitutional: She is oriented to person, place, and time. She appears well-developed and well-nourished. No distress.   HENT:   Head: Normocephalic and atraumatic.   Eyes: Conjunctivae are normal.   Neck: Neck supple.   Cardiovascular: Normal rate and regular rhythm.   Pulmonary/Chest: Effort normal.   Musculoskeletal: Normal range of motion.   Neurological: She is alert and oriented to person, place, and time.   Skin: Skin is warm and dry.   Psychiatric: She has a normal mood and affect.        This note was created using Dragon dictation.  Please excuse any grammatical errors.    "

## 2021-06-24 NOTE — PROGRESS NOTES
ASSESSMENT AND PLAN:     Problem List Items Addressed This Visit        Unprioritized    Chronic Reflux Esophagitis (Chronic)     She says she is fine as long as she is taking her reflux medications but she does need to be consistent with those otherwise she has symptoms.         mild OCD (obsessive compulsive disorder) (Chronic)     She says the Zoloft 50 mg orally per day and Wellbutrin 150 mg extended release per day are working extremely well for her OCD.  She has noted that her family and her coworkers all feels she is less irritable and she is feeling like she is able to let things go more easily.    Continue Zoloft and Wellbutrin-we did talk about titrating the dose but she likes the current dose at this point.         Class 1 drug-induced obesity with serious comorbidity and body mass index (BMI) of 33.0 to 33.9 in adult     Dx: BMI 34.63, in the setting of multiple weight related comorbid conditions including: Chronic reflux esophagitis, severe binge eating disorder, and vitamin D deficiency      Initial Weight: 208.1 lbs bmi 34.63, 7/17/2018 (restart)  Weight: 208 lb 1.6 oz 7/17/2018   Weight: 206 lb (93.4 kg) bmi 34.28 8/15/2018   Weight: 203 lb (92.1 kg) bmi 33.78 9/12/2018   Weight: 200 lb 14.4 oz (91.1 kg) bmi 33.43, 11/29/2018   Weight: 201 lb (91.2 kg) bmi 33.45, 1/16/2019   Weight: 199 lb (90.3 kg) bmi 33.12, 2/19/2019   Weight loss from initial: 9.1  % Weight loss: 4.37 %        LEXAPRO 20 mg orally per day -anxiety and obsessive-compulsive disorder  7/2017-7/2018-weight gain of 30 pounds  Dc 7/17/2018       ZOLOFT 50 mg orally per day-anxiety and OCD  7/17/2018 - 2/19/2019    - doing well feels this is working. - will continue     WELLBUTRIN 150 XL DAILY   8/15/2018 - 2/19/2019  -  works really well, weight down and she feels her appetite is much better in the evenings and her mood is much improved.       PHENTERMINE 37.5 MG PO Q DAY - For weight loss  4/19/2016-9/2016-initially lost 15 pounds but  then even though she was still taking the phentermine she gained 7 pounds.  9/2016-12/2016 she did not take phentermine and gained 10 pounds during this time  1/2017 -4/2017 -she did not take phentermine and lost 15 pounds  4/2017 - 11/2017 -weight was stable and went up and down 5 pounds despite the use of phentermine during this time  11/2017 -7/2018-gained 30 pounds probably due to use of Lexapro during this time  7/2018-we will consider use of phentermine again in the future but at this time I would like to start Zoloft and discontinue Lexapro to see how she does once she stabilized on the Zoloft we can consider adding or changing medications.     saxenda   1/16/2019 -she did look into this and thought about this at length.  She decided not to do it because of her thyroid abnormality and she is worried about potential for thyroid medullary carcinoma with this drug.        PLAN  WEIGHT MAINTENANCE PHASE (BMI DOWN FROM 34 TO 33)     CAP PLAN IMPLEMENTED  Less than or equal to 200 lbs- control  Between 201-203 lbs - action  Greater than or equal to 204 - panic      Continue zoloft and wellbutrin same dose.      She tells me gerd is worsening and she now takes gerd two times a day through GI doc. They are considering a surgical intervention.      In the future we could consider Contrave or Vyvanse or N-acetylcysteine or belviq instead of zoloft.  I want to keep in mind that she did try phentermine in the past and has mixed results with that so I am not sure what Vyvanse or repeat course of phentermine would be helpful.     Goal-this month she would like to try to increase her vegetables.  Also restart fish oil, vitamin D and chromium    Future labs-check vitamin D in May or June 2019.  She is starting a supplement today 2/19/19     FOLLOW UP IN ONE MONTH          Vitamin D deficiency     She has not been taking her vitamin D lately.  She would like to restart her vitamin D supplement and plans to start vitamin D  5000 international units daily.  Plan to recheck in May or June 2019.         RESOLVED: Joint Pain, Localized In The Left Shoulder     Chronic stable issue. She has done physical therapy for this in the past.                 Chief Complaint   Patient presents with     Weight Loss        HPI  Mary Manuel is a 33 y.o. female comes in for weight loss follow up.    Discussed saxenda briefly at our last visit in January.  She did not check with her endocrinologist to see if this would be okay in the setting of the goiter they have been watching.  Because she is too scared of taking the medication-she decided she would not take liraglutide.    I have been meaning to ask about how zoloft and wellbutrin impact your ocd: works really well.    Continues to have left shoulder pain as noted in problem list - in 2014?   Says she still does have the left shoulder pain but it is not as bad as it has been in the past.  She did physical therapy for at one point and she desires no further treatment at this point.    Are you experiencing any side effects to the medications: No  Hunger control: She feels like she does not like meat so she tends to eat more carbohydrates.  She thinks that she should may be eating more vegetables.  Exercise was discussed: Not discussed today.  Taking supplements: We did discuss starting her supplements again.  She has not been taking fish oil, vitamin D or acromion and wants to restart all of those.  Discussed journaling food: Not discussed  Patient is pleased with the current results: She feels like she would like to lose more weight and is unable.    The patient is following the nutrition plan:  no  Barriers to losing weight: She has a hard time with carbohydrates.    Social History     Tobacco Use   Smoking Status Never Smoker   Smokeless Tobacco Never Used      Current Outpatient Medications on File Prior to Visit   Medication Sig Dispense Refill     buPROPion (WELLBUTRIN XL) 150 MG 24 hr  "tablet Take 1 tablet (150 mg total) by mouth every morning. 90 tablet 3     cholecalciferol, vitamin D3, 1,000 unit capsule Take 1 capsule by mouth daily.       magnesium 250 mg Tab Take 1 tablet by mouth daily.       pantoprazole (PROTONIX) 40 MG tablet TAKE 1 TABLET (40 MG TOTAL) BY MOUTH DAILY. 90 tablet 2     sertraline (ZOLOFT) 50 MG tablet Take 1 tablet (50 mg total) by mouth daily. 90 tablet 3     [DISCONTINUED] JUNEL 1/20, 21, 1-20 mg-mcg per tablet Take 1 tablet by mouth daily.       No current facility-administered medications on file prior to visit.       Allergies   Allergen Reactions     Amoxicillin-Pot Clavulanate Nausea And Vomiting and Swelling     Ciprofloxacin      Potassium Clavulanate Other (See Comments)     Sulfamethoxazole-Trimethoprim          Review of Systems   Constitutional: Negative.    HENT: Negative.    Eyes: Negative.    Respiratory: Negative.    Cardiovascular: Negative.    Gastrointestinal: Negative.    Endocrine: Negative.    Genitourinary: Negative.    Musculoskeletal: Negative.    Skin: Negative.    Neurological: Negative.    Hematological: Negative.    Psychiatric/Behavioral: Negative.         OBJECTIVE: /76   Pulse 84   Resp 16   Ht 5' 5\" (1.651 m)   Wt 199 lb (90.3 kg)   BMI 33.12 kg/m     Physical Exam   Constitutional: She is oriented to person, place, and time. She appears well-developed and well-nourished. No distress.   HENT:   Head: Normocephalic and atraumatic.   Eyes: Conjunctivae are normal.   Neck: Neck supple.   Cardiovascular: Normal rate and regular rhythm.   Pulmonary/Chest: Effort normal.   Musculoskeletal: Normal range of motion.   Neurological: She is alert and oriented to person, place, and time.   Skin: Skin is warm and dry.   Psychiatric: She has a normal mood and affect.        This note was created using Dragon dictation.  Please excuse any grammatical errors.    "

## 2021-06-29 NOTE — PROGRESS NOTES
Progress Notes by Christine Plaza MD at 9/25/2020  3:20 PM     Author: Christine Plaza MD Service: -- Author Type: Physician    Filed: 9/28/2020  5:01 PM Encounter Date: 9/25/2020 Status: Signed    : Christine Palza MD (Physician)       FEMALE PREVENTATIVE EXAM  Multiple medical problems  addressed above and beyond usual scope of annual exam.     Assessment and Plan:   Patient has been advised of split billing requirements and indicates understanding: Yes     Problem List Items Addressed This Visit        Unprioritized    Chronic Reflux Esophagitis (Chronic)     Feels like this worsened. Plans to see GI.    She had planned to do a nissen prior to COVID, and now thinks maybe she needs to go back to that.   Continues protonix 40mg po q day.          Relevant Medications    pantoprazole (PROTONIX) 40 MG tablet    mild OCD (obsessive compulsive disorder) (Chronic)     Tried to stop zoloft 50mg po qday noted sx worsened.   Trial zoloft 100mg po q day. Plan follow up in 1 month.          Relevant Medications    phentermine (LOMAIRA) 8 mg Tab    Class 2 obesity due to excess calories without serious comorbidity with body mass index (BMI) of 35.0 to 35.9 in adult     CURRENTLY RISING- wants to restart active weight loss/  WEIGHT MAINTENANCE PHASE (BMI DOWN FROM 34 TO 33)  Dx: BMI 34.63, in the setting of multiple weight related comorbid conditions including: Chronic reflux esophagitis, severe binge eating disorder, and vitamin D deficiency     REFILLED LOMIRA 8MG PO three times a day - did not seem to work but we have not given it a whole month, she wants to give it a little more time. Refilled. Plan follow up in then next 1-2 months.      Annual exam and Labs at that time for baseline as she restarts weight loss.   Info on smart scale sent.      Follow up in 1 month with me  Follow up asap with dietician  Follow up with 3 pack health coaching asap (wants to try this before considering 24 week  plan).      Initial Weight: 208.1 lbs bmi 34.63, 7/17/2018 (restart)  Weight: 208 lb 1.6 oz 7/17/2018   Weight: 206 lb (93.4 kg) bmi 34.28 8/15/2018   Weight: 203 lb (92.1 kg) bmi 33.78 9/12/2018   Weight: 200 lb 14.4 oz (91.1 kg) bmi 33.43, 11/29/2018   Weight: 201 lb (91.2 kg) bmi 33.45, 1/16/2019   Weight: 199 lb (90.3 kg) bmi 33.12, 2/19/2019   Weight: 198 lb 3.2 oz (89.9 kg), BMI, 32, 7/24/2020   Weight: 200 lb (90.7 kg), BMI 33. 9/2020  Weight loss from initial: 8.1  % Weight loss: 3.89 %     LEXAPRO 20 mg orally per day -anxiety and obsessive-compulsive disorder  7/2017-7/2018-weight gain of 30 pounds  Dc 7/17/2018       ZOLOFT 50 mg orally per day-anxiety and OCD  7/17/2018 - 2/19/2019    - doing well feels this is working. - will continue     WELLBUTRIN 150 XL DAILY   8/15/2018 - 7/24/2020  -  DC WELLBUTRIN, WANTS TO RESTART phentermine.       PHENTERMINE  For weight loss  4/19/2016-9/2016-initially lost 15 pounds but then even though she was still taking the phentermine she gained 7 pounds.  9/2016-12/2016 she did not take phentermine and gained 10 pounds during this time  1/2017 -4/2017 -she did not take phentermine and lost 15 pounds  4/2017 - 11/2017 -weight was stable and went up and down 5 pounds despite the use of phentermine during this time  11/2017 -7/2018-gained 30 pounds probably due to use of Lexapro during this time  7/2018-we will consider use of phentermine again in the future but at this time I would like to start Zoloft and discontinue Lexapro to see how she does once she stabilized on the Zoloft we can consider adding or changing medications.  9/1/2020 - 9/25/2020 - RESTART 8MG PO TID     QSYMIA  7/24/2020 - 9/1/2020 caused GERD. ? topamax component due to phentermine being tolerated in the past     saxenda   1/16/2019 -she did look into this and thought about this at length.  She decided not to do it because of her thyroid abnormality and she is worried about potential for thyroid  medullary carcinoma with this drug.        Weaknesses:   -She has not been exercising lately due to gerd 9/1/2020   -Stress  -Genetics  - Depression  -works full time - desk job  - craves sweets, candy, chocolate, chips, crackers     Strengths:   -She is a been able to maintain the weight loss that she achieved back in 2018, now July 2020 she wants to restart weight loss and lose more  - she does the food shopping  - family supportive of her health goals  - likes veggies  -drinks water         Relevant Medications    phentermine (LOMAIRA) 8 mg Tab    Goiter, nodular - Primary     Will reorder ultrasound and thyroid function tests and she is over due to see endocrinologist. referral placed.          Relevant Orders    Ambulatory referral to Endocrinology    Thyroid [facility] (Completed)    US Thyroid    Vitamin D deficiency     Vitamin D, Total (25-Hydroxy)   Date Value Ref Range Status   07/18/2018 41.7 30.0 - 80.0 ng/mL Final      Will recheck.          Relevant Orders    Vitamin D, Total (25-Hydroxy) (Completed)    Bruxism     Wakes up with sore jaw in the mornings. Recommend tmj clinic.          Relevant Orders    [facility]ulatory referral to [facility]    Encounter for preventive care     Flu shot - declined.   Pap: will do today.   Mammo:  There is no family or personal history, not indicated    Colonoscopy:  There is no family or personal history, not indicated     Std testing desired: declined but offered  Osteoporosis prevention discussed.  vitamin d levels ordered. Recommend daily calcium and vitamin d intake to keep good bone health. Recommend weight bearing exercise, no tobacco, and limit alcohol  dexa  No indication.   Recommend sunscreen, exercise, & healthy diet.  Offered cbc, cmp, lipids and asked what other testing she  desires today  I have had an Advance Directives discussion with the patient.   Body mass index is 33.45 kg/m .   mychart active.          RESOLVED: Gastroesophageal reflux disease  without esophagitis     See chronic reflux in problem list.            Other Visit Diagnoses     Screening for HIV without presence of risk factors        Screening for malignant neoplasm of cervix        Relevant Orders    Gynecologic Cytology (PAP Smear) (Completed)    Screening, anemia, deficiency, iron        Relevant Orders    HM1(CBC and Differential) (Completed)    HM1 (CBC with Diff) (Completed)    Screening, lipid        Relevant Orders    Lipid [place] FASTING    Screening for metabolic disorder        Relevant Orders    Comprehensive Metabolic Panel (Completed)           Next follow up:  No follow-ups on file.    Immunization Review  Adult Imm Review: Missing doses of flu shot declined.   BMI: 33  Social History     Tobacco Use   Smoking Status Never Smoker   Smokeless Tobacco Never Used      I discussed the following with the patient:   Adult Healthy Living: Importance of regular exercise  Healthy nutrition  Weight loss referral options    I have had an Advance Directives discussion with the patient.    Subjective:   Chief Complaint: Mary Manuel is an 35 y.o. female here for a preventative health visit.    Patient has been advised of split billing requirements and indicates understanding: Yes  HPI:  Comes in for annual exam. She also complains of clenching her jaw and having pain related to that.      She still has problems with her gerd and needs a refill of her med and is thinking of doing the nissen which got derailed due to COVID panedemic.     She has ongoing concerns with her weight, feels like phentermine worked in the past and would like to get back on that.     Healthy Habits  Are you taking a daily aspirin? No  Do you typically exercising at least 40 min, 3-4 times per week?  Yes  Do you usually eat at least 4 servings of fruit and vegetables a day, include whole grains and fiber and avoid regularly eating high fat foods? Yes  Have you had an eye exam in the past two years? Yes  Do you  "see a dentist twice per year? Yes  Do you have any concerns regarding sleep? YES    Safety Screen  If you own firearms, are they secured in a locked gun cabinet or with trigger locks? The patient does not own any firearms  No data recorded    Review of Systems:  Please see above.  The rest of the review of systems are negative for all systems.     Pap History:   Yes - updated in Problem List and Health Maintenance accordingly  Cancer Screening       Status Date      PAP SMEAR Overdue 9/13/2004           Patient Care Team:  Christine Plaza MD as PCP - General (Family Medicine)  Christine Plaza MD as Assigned PCP        History     Reviewed By Date/Time Sections Reviewed    Christine Plaza MD 9/25/2020  4:14 PM Social Documentation    Christine Plaza MD 9/25/2020  4:12 PM Surgical            Objective:   Vital Signs:   Visit Vitals  /70 (Patient Site: Left Arm, Patient Position: Sitting, Cuff Size: Adult Large)   Pulse (!) 107   Ht 5' 5\" (1.651 m)   Wt 201 lb (91.2 kg)   SpO2 98%   BMI 33.45 kg/m           PHYSICAL EXAM    Physical Exam  Constitutional:       General: She is not in acute distress.     Appearance: She is well-developed.   HENT:      Head: Normocephalic and atraumatic.      Right Ear: Tympanic membrane, ear canal and external ear normal.      Left Ear: Tympanic membrane, ear canal and external ear normal.      Nose: Nose normal.      Mouth/Throat:      Mouth: Mucous membranes are moist.      Pharynx: Oropharynx is clear.   Eyes:      Extraocular Movements: Extraocular movements intact.      Conjunctiva/sclera: Conjunctivae normal.   Neck:      Musculoskeletal: Neck supple.   Cardiovascular:      Rate and Rhythm: Normal rate and regular rhythm.      Heart sounds: Normal heart sounds.   Pulmonary:      Effort: Pulmonary effort is normal.      Breath sounds: Normal breath sounds.   Chest:      Breasts: Breasts are symmetrical.         Right: Normal. No swelling, bleeding, inverted " nipple, mass, nipple discharge, skin change or tenderness.         Left: Normal. No swelling, bleeding, inverted nipple, mass, nipple discharge, skin change or tenderness.   Abdominal:      General: Bowel sounds are normal.      Palpations: Abdomen is soft.   Genitourinary:     Exam position: Lithotomy position.      Vagina: Normal.      Cervix: Normal.      Uterus: Normal.       Adnexa: Right adnexa normal and left adnexa normal.      Rectum: Normal.   Musculoskeletal: Normal range of motion.   Skin:     General: Skin is warm and dry.      Capillary Refill: Capillary refill takes less than 2 seconds.   Neurological:      General: No focal deficit present.      Mental Status: She is alert and oriented to person, place, and time.   Psychiatric:         Mood and Affect: Mood normal.         Behavior: Behavior normal.         Thought Content: Thought content normal.         Judgment: Judgment normal.                 Medication List          Accurate as of September 25, 2020 11:59 PM. If you have any questions, ask your nurse or doctor.            CHANGE how you take these medications    pantoprazole 40 MG tablet  Also known as:  PROTONIX  INSTRUCTIONS:  TAKE 1 TABLET (40 MG TOTAL) BY MOUTH DAILY.  What changed:  when to take this           CONTINUE taking these medications    cholecalciferol (vitamin D3) 25 mcg (1,000 unit) capsule  INSTRUCTIONS:  Take 1 capsule by mouth daily. 5000        Lomaira 8 mg Tab  INSTRUCTIONS:  Take 8 mg by mouth 3 (three) times a day.  Generic drug:  phentermine        magnesium 250 mg Tab  INSTRUCTIONS:  Take 1 tablet by mouth daily.        metroNIDAZOLE 0.75 % cream  Also known as:  METROCREAM  INSTRUCTIONS:  APPLY TO FACE EVERY DAY        sertraline 50 MG tablet  Also known as:  ZOLOFT  INSTRUCTIONS:  Take 50 mg by mouth daily.        SUMAtriptan 100 MG tablet  Also known as:  IMITREX  INSTRUCTIONS:  Take 1 tablet (100 mg total) by mouth once as needed for migraine (once daily as  needed).        ZyrTEC 10 MG tablet  INSTRUCTIONS:  Take 1 tablet by mouth.  Generic drug:  cetirizine           STOP taking these medications    Qsymia 3.75-23 mg 24 hour capsule  Generic drug:  phentermine-topiramate  Stopped by:  Christine Plaza MD           Where to Get Your Medications      These medications were sent to [facility]/pharmacy #[id number] - [place], [address], [facility] [address]    Phone:  [phone]     Lomaira 8 mg Tab    pantoprazole 40 MG tablet         Additional Screenings Completed Today:

## 2021-09-13 ENCOUNTER — E-VISIT (OUTPATIENT)
Dept: FAMILY MEDICINE | Facility: CLINIC | Age: 36
End: 2021-09-13
Payer: COMMERCIAL

## 2021-09-13 DIAGNOSIS — E04.9 GOITER, NODULAR: Primary | ICD-10-CM

## 2021-09-13 PROCEDURE — 99421 OL DIG E/M SVC 5-10 MIN: CPT | Performed by: FAMILY MEDICINE

## 2021-09-14 NOTE — ASSESSMENT & PLAN NOTE
S: enlarging goiter per patient on evisit.   O: hx of goiter  A/p  Tsh/ free t4 order placed.   Us thyroid order placed.

## 2021-09-14 NOTE — PATIENT INSTRUCTIONS
Thank you for choosing us for your care. Given your symptoms, I would like you to do a lab-only visit for thyroid as well as ultrasound.  I have placed the orders.  Please schedule an appointment with the lab right here in [facility], or call [phone].  I will let you know when the results are back and next steps to take.

## 2021-09-14 NOTE — TELEPHONE ENCOUNTER
Provider E-Visit time total (minutes): 10    S: enlarging goiter per patient on evisit.   O: hx of goiter  A/p  Tsh/ free t4 order placed.   Us thyroid order placed.

## 2021-09-16 ENCOUNTER — LAB (OUTPATIENT)
Dept: LAB | Facility: CLINIC | Age: 36
End: 2021-09-16
Payer: COMMERCIAL

## 2021-09-16 DIAGNOSIS — E04.9 GOITER, NODULAR: ICD-10-CM

## 2021-09-16 LAB — TSH SERPL DL<=0.005 MIU/L-ACNC: 0.31 UIU/ML (ref 0.3–5)

## 2021-09-16 PROCEDURE — 36415 COLL VENOUS BLD VENIPUNCTURE: CPT

## 2021-09-16 PROCEDURE — 84443 ASSAY THYROID STIM HORMONE: CPT

## 2021-09-26 ENCOUNTER — HOSPITAL ENCOUNTER (OUTPATIENT)
Dept: ULTRASOUND IMAGING | Facility: CLINIC | Age: 36
Discharge: HOME | End: 2021-09-26
Attending: FAMILY MEDICINE | Admitting: FAMILY MEDICINE
Payer: COMMERCIAL

## 2021-09-26 DIAGNOSIS — E04.9 GOITER, NODULAR: ICD-10-CM

## 2021-09-26 PROCEDURE — 76536 US EXAM OF HEAD AND NECK: CPT

## 2021-10-10 ENCOUNTER — HEALTH MAINTENANCE LETTER (OUTPATIENT)
Age: 36
End: 2021-10-10

## 2021-10-27 ENCOUNTER — TRANSFERRED RECORDS (OUTPATIENT)
Dept: HEALTH INFORMATION MANAGEMENT | Facility: CLINIC | Age: 36
End: 2021-10-27
Payer: COMMERCIAL

## 2021-11-10 ENCOUNTER — TRANSFERRED RECORDS (OUTPATIENT)
Dept: HEALTH INFORMATION MANAGEMENT | Facility: CLINIC | Age: 36
End: 2021-11-10
Payer: COMMERCIAL

## 2021-11-19 ENCOUNTER — TRANSFERRED RECORDS (OUTPATIENT)
Dept: HEALTH INFORMATION MANAGEMENT | Facility: CLINIC | Age: 36
End: 2021-11-19
Payer: COMMERCIAL

## 2021-12-05 ENCOUNTER — HEALTH MAINTENANCE LETTER (OUTPATIENT)
Age: 36
End: 2021-12-05

## 2021-12-09 ENCOUNTER — TRANSFERRED RECORDS (OUTPATIENT)
Dept: HEALTH INFORMATION MANAGEMENT | Facility: CLINIC | Age: 36
End: 2021-12-09

## 2022-01-26 ENCOUNTER — TRANSFERRED RECORDS (OUTPATIENT)
Dept: HEALTH INFORMATION MANAGEMENT | Facility: CLINIC | Age: 37
End: 2022-01-26

## 2022-03-28 DIAGNOSIS — F42.2 MIXED OBSESSIONAL THOUGHTS AND ACTS: ICD-10-CM

## 2022-03-30 NOTE — TELEPHONE ENCOUNTER
"Routing refill request to provider for review/approval because:  Patient needs to be seen because it has been more than 1 year since last office visit.      Last Written Prescription Date:  1/26/21  Last Fill Quantity: 90,  # refills: 3   Last office visit provider: 1/27/21          Requested Prescriptions   Pending Prescriptions Disp Refills     sertraline (ZOLOFT) 100 MG tablet [Pharmacy Med Name: SERTRALINE  MG TABLET] 90 tablet 3     Sig: TAKE 1 TABLET BY MOUTH EVERY DAY       SSRIs Protocol Passed - 3/28/2022 12:24 AM        Passed - Recent (12 mo) or future (30 days) visit within the authorizing provider's specialty     Patient has had an office visit with the authorizing provider or a provider within the authorizing providers department within the previous 12 mos or has a future within next 30 days. See \"Patient Info\" tab in inbasket, or \"Choose Columns\" in Meds & Orders section of the refill encounter.              Passed - Medication is active on med list        Passed - Patient is age 18 or older        Passed - No active pregnancy on record        Passed - No positive pregnancy test in last 12 months             Kerri Villa RN 03/30/22 8:22 AM  "

## 2022-03-31 RX ORDER — SERTRALINE HYDROCHLORIDE 100 MG/1
TABLET, FILM COATED ORAL
Qty: 90 TABLET | Refills: 3 | OUTPATIENT
Start: 2022-03-31

## 2022-05-17 ENCOUNTER — MYC MEDICAL ADVICE (OUTPATIENT)
Dept: FAMILY MEDICINE | Facility: CLINIC | Age: 37
End: 2022-05-17

## 2022-05-17 DIAGNOSIS — F42.9 OBSESSIVE-COMPULSIVE DISORDER, UNSPECIFIED TYPE: Primary | ICD-10-CM

## 2022-05-17 NOTE — TELEPHONE ENCOUNTER
Pt has appt on 6/14 requesting fill until appt 30 tabs prepped below please review and send if agreed

## 2022-05-22 RX ORDER — SERTRALINE HYDROCHLORIDE 100 MG/1
TABLET, FILM COATED ORAL
Qty: 90 TABLET | Refills: 3 | OUTPATIENT
Start: 2022-05-22

## 2022-05-22 NOTE — TELEPHONE ENCOUNTER
"Routing refill request to provider for review/approval because:  Drug not active on patient's medication list    Last Written Prescription Date:    Last Fill Quantity: ,  # refills:    Last office visit provider:  1/27/21     Requested Prescriptions   Pending Prescriptions Disp Refills     sertraline (ZOLOFT) 100 MG tablet [Pharmacy Med Name: SERTRALINE  MG TABLET] 90 tablet 3     Sig: TAKE 1 TABLET BY MOUTH EVERY DAY       SSRIs Protocol Passed - 5/20/2022 12:10 PM        Passed - Recent (12 mo) or future (30 days) visit within the authorizing provider's specialty     Patient has had an office visit with the authorizing provider or a provider within the authorizing providers department within the previous 12 mos or has a future within next 30 days. See \"Patient Info\" tab in inbasket, or \"Choose Columns\" in Meds & Orders section of the refill encounter.              Passed - Medication is active on med list        Passed - Patient is age 18 or older        Passed - No active pregnancy on record        Passed - No positive pregnancy test in last 12 months             Jaonne Cheung, RN 05/22/22 4:20 PM  "

## 2022-06-14 DIAGNOSIS — F42.9 OBSESSIVE-COMPULSIVE DISORDER, UNSPECIFIED TYPE: ICD-10-CM

## 2022-06-15 NOTE — TELEPHONE ENCOUNTER
"Last Written Prescription Date:  5/20/22  Last Fill Quantity: 30,  # refills: 0   Last office visit provider:  1/27/21 - scheduled 7/12/22    Requested Prescriptions   Pending Prescriptions Disp Refills     sertraline (ZOLOFT) 50 MG tablet [Pharmacy Med Name: SERTRALINE HCL 50 MG TABLET] 30 tablet 0     Sig: TAKE 1 TABLET BY MOUTH EVERY DAY       SSRIs Protocol Passed - 6/15/2022  9:03 AM        Passed - Recent (12 mo) or future (30 days) visit within the authorizing provider's specialty     Patient has had an office visit with the authorizing provider or a provider within the authorizing providers department within the previous 12 mos or has a future within next 30 days. See \"Patient Info\" tab in inbasket, or \"Choose Columns\" in Meds & Orders section of the refill encounter.              Passed - Medication is active on med list        Passed - Patient is age 18 or older        Passed - No active pregnancy on record        Passed - No positive pregnancy test in last 12 months             Erick Cavanaugh RN 06/15/22 9:03 AM  "

## 2022-09-18 ENCOUNTER — HEALTH MAINTENANCE LETTER (OUTPATIENT)
Age: 37
End: 2022-09-18

## 2022-09-27 ENCOUNTER — VIRTUAL VISIT (OUTPATIENT)
Dept: FAMILY MEDICINE | Facility: CLINIC | Age: 37
End: 2022-09-27
Payer: COMMERCIAL

## 2022-09-27 VITALS — HEART RATE: 69 BPM

## 2022-09-27 DIAGNOSIS — F42.9 OBSESSIVE-COMPULSIVE DISORDER, UNSPECIFIED TYPE: Chronic | ICD-10-CM

## 2022-09-27 PROBLEM — K21.9 GASTROESOPHAGEAL REFLUX DISEASE WITHOUT ESOPHAGITIS: Status: ACTIVE | Noted: 2018-04-09

## 2022-09-27 PROCEDURE — 99213 OFFICE O/P EST LOW 20 MIN: CPT | Mod: GT | Performed by: FAMILY MEDICINE

## 2022-09-27 RX ORDER — SERTRALINE HYDROCHLORIDE 100 MG/1
100 TABLET, FILM COATED ORAL DAILY
Qty: 90 TABLET | Refills: 1 | Status: SHIPPED | OUTPATIENT
Start: 2022-09-27 | End: 2023-03-15

## 2022-09-27 ASSESSMENT — ANXIETY QUESTIONNAIRES
4. TROUBLE RELAXING: SEVERAL DAYS
1. FEELING NERVOUS, ANXIOUS, OR ON EDGE: NOT AT ALL
GAD7 TOTAL SCORE: 2
2. NOT BEING ABLE TO STOP OR CONTROL WORRYING: NOT AT ALL
GAD7 TOTAL SCORE: 2
5. BEING SO RESTLESS THAT IT IS HARD TO SIT STILL: NOT AT ALL
7. FEELING AFRAID AS IF SOMETHING AWFUL MIGHT HAPPEN: NOT AT ALL
6. BECOMING EASILY ANNOYED OR IRRITABLE: SEVERAL DAYS
GAD7 TOTAL SCORE: 2
3. WORRYING TOO MUCH ABOUT DIFFERENT THINGS: NOT AT ALL
7. FEELING AFRAID AS IF SOMETHING AWFUL MIGHT HAPPEN: NOT AT ALL
8. IF YOU CHECKED OFF ANY PROBLEMS, HOW DIFFICULT HAVE THESE MADE IT FOR YOU TO DO YOUR WORK, TAKE CARE OF THINGS AT HOME, OR GET ALONG WITH OTHER PEOPLE?: NOT DIFFICULT AT ALL
IF YOU CHECKED OFF ANY PROBLEMS ON THIS QUESTIONNAIRE, HOW DIFFICULT HAVE THESE PROBLEMS MADE IT FOR YOU TO DO YOUR WORK, TAKE CARE OF THINGS AT HOME, OR GET ALONG WITH OTHER PEOPLE: NOT DIFFICULT AT ALL

## 2022-09-27 ASSESSMENT — PATIENT HEALTH QUESTIONNAIRE - PHQ9
SUM OF ALL RESPONSES TO PHQ QUESTIONS 1-9: 0
10. IF YOU CHECKED OFF ANY PROBLEMS, HOW DIFFICULT HAVE THESE PROBLEMS MADE IT FOR YOU TO DO YOUR WORK, TAKE CARE OF THINGS AT HOME, OR GET ALONG WITH OTHER PEOPLE: NOT DIFFICULT AT ALL
SUM OF ALL RESPONSES TO PHQ QUESTIONS 1-9: 0

## 2022-09-27 NOTE — PROGRESS NOTES
Mary is a 37 year old who is being evaluated via a billable video visit.      How would you like to obtain your AVS? MyChart  If the video visit is dropped, the invitation should be resent by: Text to cell phone: [phone]  Will anyone else be joining your video visit? No      Video-Visit Details    Video Start Time: 5:43 PM    Type of service:  Video Visit    Video End Time:5:55 PM    Originating Location (pt. Location): Home    Distant Location (provider location):  [facility]     Platform used for Video Visit: [employer]    Problem List Items Addressed This Visit        Behavioral    mild OCD (obsessive compulsive disorder) (Chronic)     Would like to try zoloft 100mg orally per day. But if doesn't like she can revert to the 50 mg dose.           Relevant Medications    sertraline (ZOLOFT) 100 MG tablet         Follow-up Visit   Expected date:  Oct 11, 2022 (Approximate)      Follow Up Appointment Details:     Follow-up with whom?: PCP    Follow-Up for what?: Adult Preventive    How?: In Person    Is this an as-needed follow-up?: No                     Subjective   Mary is a 37 year old who presents for the following health issues   Chief Complaint   Patient presents with     med check      History of Present Illness       Mental Health Follow-up:  Patient presents to follow-up on Depression & Anxiety.Patient's depression since last visit has been:  Better  The patient is not having other symptoms associated with depression.  Patient's anxiety since last visit has been:  Better  The patient is not having other symptoms associated with anxiety.  Any significant life events: No  Patient is not feeling anxious or having panic attacks.  Patient has no concerns about alcohol or drug use.    She eats 4 or more servings of fruits and vegetables daily.She consumes 1 sweetened beverage(s) daily.She exercises with enough effort to increase her heart rate 60 or more minutes per day.  She  exercises with enough effort to increase her heart rate 5 days per week.   She is taking medications regularly.    Today's PHQ-9         PHQ-9 Total Score: 0    PHQ-9 Q9 Thoughts of better off dead/self-harm past 2 weeks :   Not at all    How difficult have these problems made it for you to do your work, take care of things at home, or get along with other people: Not difficult at all  Today's CLAUDIO-7 Score: 2           Objective           Vitals:  No vitals were obtained today due to virtual visit.    Physical Exam  Constitutional:       Appearance: Normal appearance.   HENT:      Head: Normocephalic and atraumatic.   Pulmonary:      Effort: Pulmonary effort is normal.   Musculoskeletal:         General: Normal range of motion.      Cervical back: Normal range of motion and neck supple.   Neurological:      General: No focal deficit present.      Mental Status: She is alert and oriented to person, place, and time.            This note has been dictated using voice recognition software. Any grammatical or context distortions are unintentional and inherent to the software

## 2022-09-27 NOTE — ASSESSMENT & PLAN NOTE
Would like to try zoloft 100mg orally per day. But if doesn't like she can revert to the 50 mg dose.

## 2023-01-29 ENCOUNTER — HEALTH MAINTENANCE LETTER (OUTPATIENT)
Age: 38
End: 2023-01-29

## 2023-03-15 ENCOUNTER — OFFICE VISIT (OUTPATIENT)
Dept: FAMILY MEDICINE | Facility: CLINIC | Age: 38
End: 2023-03-15
Attending: FAMILY MEDICINE
Payer: COMMERCIAL

## 2023-03-15 VITALS
OXYGEN SATURATION: 97 % | BODY MASS INDEX: 36.53 KG/M2 | HEIGHT: 66 IN | SYSTOLIC BLOOD PRESSURE: 122 MMHG | RESPIRATION RATE: 16 BRPM | WEIGHT: 227.3 LBS | DIASTOLIC BLOOD PRESSURE: 86 MMHG | HEART RATE: 76 BPM

## 2023-03-15 DIAGNOSIS — Z12.4 SCREENING FOR MALIGNANT NEOPLASM OF CERVIX: ICD-10-CM

## 2023-03-15 DIAGNOSIS — E66.01 CLASS 2 SEVERE OBESITY WITH SERIOUS COMORBIDITY AND BODY MASS INDEX (BMI) OF 37.0 TO 37.9 IN ADULT, UNSPECIFIED OBESITY TYPE (H): ICD-10-CM

## 2023-03-15 DIAGNOSIS — R21 BUTTERFLY RASH: ICD-10-CM

## 2023-03-15 DIAGNOSIS — E55.9 VITAMIN D DEFICIENCY: ICD-10-CM

## 2023-03-15 DIAGNOSIS — Z30.8 ENCOUNTER FOR OTHER CONTRACEPTIVE MANAGEMENT: ICD-10-CM

## 2023-03-15 DIAGNOSIS — F42.9 OBSESSIVE-COMPULSIVE DISORDER, UNSPECIFIED TYPE: Chronic | ICD-10-CM

## 2023-03-15 DIAGNOSIS — Z13.1 SCREENING FOR DIABETES MELLITUS: ICD-10-CM

## 2023-03-15 DIAGNOSIS — Z00.00 ENCOUNTER FOR PREVENTIVE CARE: ICD-10-CM

## 2023-03-15 DIAGNOSIS — E04.9 GOITER, NODULAR: ICD-10-CM

## 2023-03-15 DIAGNOSIS — F50.812 SEVERE BINGE-EATING DISORDER: ICD-10-CM

## 2023-03-15 DIAGNOSIS — Z11.59 NEED FOR HEPATITIS C SCREENING TEST: Primary | ICD-10-CM

## 2023-03-15 DIAGNOSIS — Z13.228 SCREENING FOR METABOLIC DISORDER: ICD-10-CM

## 2023-03-15 DIAGNOSIS — Z13.220 SCREENING, LIPID: ICD-10-CM

## 2023-03-15 DIAGNOSIS — Z13.0 SCREENING, ANEMIA, DEFICIENCY, IRON: ICD-10-CM

## 2023-03-15 DIAGNOSIS — E66.812 CLASS 2 SEVERE OBESITY WITH SERIOUS COMORBIDITY AND BODY MASS INDEX (BMI) OF 37.0 TO 37.9 IN ADULT, UNSPECIFIED OBESITY TYPE (H): ICD-10-CM

## 2023-03-15 DIAGNOSIS — G43.009 MIGRAINE WITHOUT AURA AND WITHOUT STATUS MIGRAINOSUS, NOT INTRACTABLE: ICD-10-CM

## 2023-03-15 PROBLEM — K21.9 GASTROESOPHAGEAL REFLUX DISEASE WITHOUT ESOPHAGITIS: Status: RESOLVED | Noted: 2018-04-09 | Resolved: 2023-03-15

## 2023-03-15 PROBLEM — K44.9 DIAPHRAGMATIC HERNIA: Status: RESOLVED | Noted: 2018-04-23 | Resolved: 2023-03-15

## 2023-03-15 LAB
ALBUMIN SERPL BCG-MCNC: 4.6 G/DL (ref 3.5–5.2)
ALP SERPL-CCNC: 52 U/L (ref 35–104)
ALT SERPL W P-5'-P-CCNC: 22 U/L (ref 10–35)
ANION GAP SERPL CALCULATED.3IONS-SCNC: 13 MMOL/L (ref 7–15)
AST SERPL W P-5'-P-CCNC: 24 U/L (ref 10–35)
BASOPHILS # BLD AUTO: 0 10E3/UL (ref 0–0.2)
BASOPHILS NFR BLD AUTO: 1 %
BILIRUB SERPL-MCNC: 0.4 MG/DL
BUN SERPL-MCNC: 13.1 MG/DL (ref 6–20)
CALCIUM SERPL-MCNC: 9.5 MG/DL (ref 8.6–10)
CHLORIDE SERPL-SCNC: 105 MMOL/L (ref 98–107)
CHOLEST SERPL-MCNC: 235 MG/DL
CREAT SERPL-MCNC: 0.85 MG/DL (ref 0.51–0.95)
DEPRECATED HCO3 PLAS-SCNC: 24 MMOL/L (ref 22–29)
EOSINOPHIL # BLD AUTO: 0.1 10E3/UL (ref 0–0.7)
EOSINOPHIL NFR BLD AUTO: 3 %
ERYTHROCYTE [DISTWIDTH] IN BLOOD BY AUTOMATED COUNT: 12.9 % (ref 10–15)
GFR SERPL CREATININE-BSD FRML MDRD: 89 ML/MIN/1.73M2
GLUCOSE SERPL-MCNC: 93 MG/DL (ref 70–99)
HBA1C MFR BLD: 4.9 % (ref 0–5.6)
HCT VFR BLD AUTO: 42.4 % (ref 35–47)
HDLC SERPL-MCNC: 62 MG/DL
HGB BLD-MCNC: 14 G/DL (ref 11.7–15.7)
IMM GRANULOCYTES # BLD: 0 10E3/UL
IMM GRANULOCYTES NFR BLD: 0 %
LDLC SERPL CALC-MCNC: 146 MG/DL
LYMPHOCYTES # BLD AUTO: 1.5 10E3/UL (ref 0.8–5.3)
LYMPHOCYTES NFR BLD AUTO: 33 %
MCH RBC QN AUTO: 33.3 PG (ref 26.5–33)
MCHC RBC AUTO-ENTMCNC: 33 G/DL (ref 31.5–36.5)
MCV RBC AUTO: 101 FL (ref 78–100)
MONOCYTES # BLD AUTO: 0.4 10E3/UL (ref 0–1.3)
MONOCYTES NFR BLD AUTO: 9 %
NEUTROPHILS # BLD AUTO: 2.3 10E3/UL (ref 1.6–8.3)
NEUTROPHILS NFR BLD AUTO: 54 %
NONHDLC SERPL-MCNC: 173 MG/DL
PLATELET # BLD AUTO: 206 10E3/UL (ref 150–450)
POTASSIUM SERPL-SCNC: 4.4 MMOL/L (ref 3.4–5.3)
PROT SERPL-MCNC: 7.3 G/DL (ref 6.4–8.3)
RBC # BLD AUTO: 4.21 10E6/UL (ref 3.8–5.2)
SODIUM SERPL-SCNC: 142 MMOL/L (ref 136–145)
TRIGL SERPL-MCNC: 134 MG/DL
TSH SERPL DL<=0.005 MIU/L-ACNC: 0.82 UIU/ML (ref 0.3–4.2)
WBC # BLD AUTO: 4.4 10E3/UL (ref 4–11)

## 2023-03-15 PROCEDURE — 82306 VITAMIN D 25 HYDROXY: CPT | Performed by: FAMILY MEDICINE

## 2023-03-15 PROCEDURE — 80050 GENERAL HEALTH PANEL: CPT | Performed by: FAMILY MEDICINE

## 2023-03-15 PROCEDURE — 99395 PREV VISIT EST AGE 18-39: CPT | Performed by: FAMILY MEDICINE

## 2023-03-15 PROCEDURE — 83036 HEMOGLOBIN GLYCOSYLATED A1C: CPT | Performed by: FAMILY MEDICINE

## 2023-03-15 PROCEDURE — 36415 COLL VENOUS BLD VENIPUNCTURE: CPT | Performed by: FAMILY MEDICINE

## 2023-03-15 PROCEDURE — 99214 OFFICE O/P EST MOD 30 MIN: CPT | Mod: 25 | Performed by: FAMILY MEDICINE

## 2023-03-15 PROCEDURE — 86803 HEPATITIS C AB TEST: CPT | Performed by: FAMILY MEDICINE

## 2023-03-15 PROCEDURE — 80061 LIPID PANEL: CPT | Performed by: FAMILY MEDICINE

## 2023-03-15 RX ORDER — SUMATRIPTAN 5 MG/1
1 SPRAY NASAL PRN
Qty: 1 EACH | Refills: 0 | Status: SHIPPED | OUTPATIENT
Start: 2023-03-15 | End: 2024-08-27

## 2023-03-15 RX ORDER — SERTRALINE HYDROCHLORIDE 100 MG/1
100 TABLET, FILM COATED ORAL DAILY
Qty: 90 TABLET | Refills: 1 | Status: SHIPPED | OUTPATIENT
Start: 2023-03-15 | End: 2023-10-30

## 2023-03-15 ASSESSMENT — PATIENT HEALTH QUESTIONNAIRE - PHQ9: SUM OF ALL RESPONSES TO PHQ QUESTIONS 1-9: 0

## 2023-03-15 ASSESSMENT — ENCOUNTER SYMPTOMS
NERVOUS/ANXIOUS: 0
MYALGIAS: 0
DIZZINESS: 0
NAUSEA: 0
HEADACHES: 0
SHORTNESS OF BREATH: 0
DYSURIA: 0
FREQUENCY: 0
ARTHRALGIAS: 0
HEMATOCHEZIA: 0
HEMATURIA: 0
JOINT SWELLING: 0
PALPITATIONS: 0
EYE PAIN: 0
DIARRHEA: 0
CONSTIPATION: 0
WEAKNESS: 0
HEARTBURN: 0
BREAST MASS: 0
FEVER: 0
ABDOMINAL PAIN: 0
PARESTHESIAS: 0
CHILLS: 0
SORE THROAT: 0
COUGH: 0

## 2023-03-15 NOTE — ASSESSMENT & PLAN NOTE
TSH and free T4 ordered today to monitor.  She saw endocrinology in 2020 and it was recommended that she follow-up within 2 years.  Endocrinology referral is placed today so she can do that and I did see stable ultrasound done 9/2021

## 2023-03-15 NOTE — ASSESSMENT & PLAN NOTE
Contraception - discussed.   Vaccines offered today-hepatitis B vaccine  Pap: Normal Pap smear in 2020 plan repeat now. She declined saying not due until September. So will do at next visit.   Mammo:  There is no family or personal history, not indicated    Colonoscopy:  There is no family or personal history, not indicated     Std testing desired:  offered  Osteoporosis prevention discussed.  vitamin d levels ordered. Recommend daily calcium and vitamin d intake to keep good bone health. Recommend weight bearing exercise, no tobacco, and limit alcohol  dexa - no indication  Recommend sunscreen, exercise, & healthy diet.  Offered cbc, cmp, lipids and asked what other testing she  desires today  I have had an Advance Directives discussion with the patient.   Body mass index is 37.25 kg/m .   mychart active.

## 2023-03-15 NOTE — ASSESSMENT & PLAN NOTE
Severe obesity-worsening.  Setting-chronic comorbid weight related conditions including-GERD with hiatal hernia repair in the past, severe binge eating disorder and OCD are noted.

## 2023-03-15 NOTE — ASSESSMENT & PLAN NOTE
Controlled on sertraline 100 mg po q day. She says she gets about 6 migraines per year, used Imitrex orally in the past and wants to try nasal spray.

## 2023-03-15 NOTE — PROGRESS NOTES
ocd meds, migraine, and goiter addressed above and beyond usual scope of annual exam.      SUBJECTIVE:   CC: Mary is an 37 year old who presents for preventive health visit.   Patient has been advised of split billing requirements and indicates understanding: Yes  Healthy Habits:     Getting at least 3 servings of Calcium per day:  NO    Bi-annual eye exam:  Yes    Dental care twice a year:  Yes    Sleep apnea or symptoms of sleep apnea:  None    Diet:  Regular (no restrictions)    Frequency of exercise:  4-5 days/week    Duration of exercise:  15-30 minutes    Taking medications regularly:  Yes    Medication side effects:  Not applicable    PHQ-2 Total Score: 0    Additional concerns today:  No      Today's PHQ-2 Score:   PHQ-2 ( 1999 [employer]) 3/15/2023   Q1: Little interest or pleasure in doing things 0   Q2: Feeling down, depressed or hopeless 0   PHQ-2 Score 0   Q1: Little interest or pleasure in doing things Not at all   Q2: Feeling down, depressed or hopeless Not at all   PHQ-2 Score 0       Social History     Tobacco Use     Smoking status: Never     Smokeless tobacco: Never   Substance Use Topics     Alcohol use: Not on file         Alcohol Use 3/15/2023   Prescreen: >3 drinks/day or >7 drinks/week? No       Reviewed orders with patient.  Reviewed health maintenance and updated orders accordingly - Yes    Breast Cancer Screening:    FHS-7: No flowsheet data found.    Patient under 40 years of age: Routine Mammogram Screening not recommended.   Pertinent mammograms are reviewed under the imaging tab.    History of abnormal Pap smear: NO - age 30-65 PAP every 5 years with negative HPV co-testing recommended  PAP / HPV Latest Ref Rng & Units 9/25/2020   PAP Negative for squamous intraepithelial lesion or malignancy. Negative for squamous intraepithelial lesion or malignancy  Electronically signed by Robert Garza CT (ASCP) on 9/28/2020 at  3:38 PM       Reviewed and updated as needed this visit by  "clinical staff   Tobacco  Allergies  Meds  Problems  Med Hx  Surg Hx  Fam Hx  Soc   Hx        Reviewed and updated as needed this visit by Provider   Tobacco  Allergies  Meds  Problems  Med Hx  Surg Hx  Fam Hx           Review of Systems   Constitutional: Negative for chills and fever.   HENT: Negative for congestion, ear pain, hearing loss and sore throat.    Eyes: Negative for pain and visual disturbance.   Respiratory: Negative for cough and shortness of breath.    Cardiovascular: Negative for chest pain, palpitations and peripheral edema.   Gastrointestinal: Negative for abdominal pain, constipation, diarrhea, heartburn, hematochezia and nausea.   Breasts:  Negative for tenderness, breast mass and discharge.   Genitourinary: Negative for dysuria, frequency, genital sores, hematuria, pelvic pain, urgency, vaginal bleeding and vaginal discharge.   Musculoskeletal: Negative for arthralgias, joint swelling and myalgias.   Skin: Negative for rash.   Neurological: Negative for dizziness, weakness, headaches and paresthesias.   Psychiatric/Behavioral: Negative for mood changes. The patient is not nervous/anxious.         OBJECTIVE:   /86 (BP Location: Left arm, Patient Position: Sitting, Cuff Size: Adult Large)   Pulse 76   Resp 16   Ht 1.664 m (5' 5.5\")   Wt 103.1 kg (227 lb 4.8 oz)   LMP 03/10/2023   SpO2 97%   BMI 37.25 kg/m    Physical Exam  Constitutional:       Appearance: Normal appearance.   HENT:      Head: Normocephalic and atraumatic.   Cardiovascular:      Rate and Rhythm: Normal rate and regular rhythm.      Heart sounds: Normal heart sounds.   Pulmonary:      Effort: Pulmonary effort is normal.      Breath sounds: Normal breath sounds.   Chest:      Comments: Declined breast exam  Abdominal:      General: Bowel sounds are normal.      Palpations: Abdomen is soft.   Genitourinary:     Comments: Declined gyn exam  Musculoskeletal:         General: Normal range of motion.      " Cervical back: Normal range of motion and neck supple.   Skin:     General: Skin is warm and dry.      Capillary Refill: Capillary refill takes less than 2 seconds.   Neurological:      General: No focal deficit present.      Mental Status: She is alert and oriented to person, place, and time.   Psychiatric:         Mood and Affect: Mood normal.         Behavior: Behavior normal.         Thought Content: Thought content normal.         Judgment: Judgment normal.         ASSESSMENT/PLAN:     Problem List Items Addressed This Visit        Nervous and Auditory    Migraine without aura and without status migrainosus, not intractable     Controlled on sertraline 100 mg po q day. She says she gets about 6 migraines per year, used Imitrex orally in the past and wants to try nasal spray.         Relevant Medications    sertraline (ZOLOFT) 100 MG tablet    SUMAtriptan (IMITREX) 5 MG/ACT nasal spray       Digestive    Class 2 severe obesity with serious comorbidity and body mass index (BMI) of 37.0 to 37.9 in adult (H)     Severe obesity-worsening.  Setting-chronic comorbid weight related conditions including-GERD with hiatal hernia repair in the past, severe binge eating disorder and OCD are noted.         Vitamin D deficiency     We will recheck and titrate         Relevant Orders    Vitamin D Deficiency       Endocrine    Goiter, nodular     TSH and free T4 ordered today to monitor.  She saw endocrinology in 2020 and it was recommended that she follow-up within 2 years.  Endocrinology referral is placed today so she can do that and I did see stable ultrasound done 9/2021         Relevant Orders    TSH with free T4 reflex    Adult Endocrinology  Referral       Musculoskeletal and Integumentary    Butterfly rash     Labs negative for lupus will defer to dermatology            Behavioral    mild OCD (obsessive compulsive disorder) (Chronic)     Mild OCD- controlled with serotonin 100 mg p.o. daily.  Refilled today  PHQ-9 was noted to be 0.  Follow-up in 6 months.         Relevant Medications    sertraline (ZOLOFT) 100 MG tablet    RESOLVED: Severe binge-eating disorder     She is on sertraline and it seems to be controlled and she says she is not binge eating any more.             Other    Encounter for preventive care     Contraception - discussed.   Vaccines offered today-hepatitis B vaccine  Pap: Normal Pap smear in 2020 plan repeat now. She declined saying not due until September. So will do at next visit.   Mammo:  There is no family or personal history, not indicated    Colonoscopy:  There is no family or personal history, not indicated     Std testing desired:  offered  Osteoporosis prevention discussed.  vitamin d levels ordered. Recommend daily calcium and vitamin d intake to keep good bone health. Recommend weight bearing exercise, no tobacco, and limit alcohol  dexa - no indication  Recommend sunscreen, exercise, & healthy diet.  Offered cbc, cmp, lipids and asked what other testing she  desires today  I have had an Advance Directives discussion with the patient.   Body mass index is 37.25 kg/m .   mychart active.          Encounter for other contraceptive management     She is happy with condoms.         Other Visit Diagnoses     Need for hepatitis C screening test    -  Primary    Relevant Orders    Hepatitis C Screen Reflex to HCV RNA Quant and Genotype    Screening for metabolic disorder        Relevant Orders    Comprehensive metabolic panel    Screening, anemia, deficiency, iron        Relevant Orders    CBC with Platelets & Differential    Screening, lipid        Relevant Orders    Lipid Profile    Screening for diabetes mellitus        Relevant Orders    Hemoglobin A1c    Screening for malignant neoplasm of cervix              Patient has been advised of split billing requirements and indicates understanding: Yes      COUNSELING:  Reviewed preventive health counseling, as reflected in patient instructions     "   Regular exercise       Healthy diet/nutrition       Contraception       Safe sex practices/STD prevention       Advance Care Planning      BMI:   Estimated body mass index is 37.25 kg/m  as calculated from the following:    Height as of this encounter: 1.664 m (5' 5.5\").    Weight as of this encounter: 103.1 kg (227 lb 4.8 oz).   Weight management plan: Discussed healthy diet and exercise guidelines      She reports that she has never smoked. She has never used smokeless tobacco.      Christine Plaza MD  [facility]  "

## 2023-03-15 NOTE — ASSESSMENT & PLAN NOTE
Mild OCD- controlled with serotonin 100 mg p.o. daily.  Refilled today PHQ-9 was noted to be 0.  Follow-up in 6 months.

## 2023-03-16 LAB
DEPRECATED CALCIDIOL+CALCIFEROL SERPL-MC: 26 UG/L (ref 20–75)
HCV AB SERPL QL IA: NONREACTIVE

## 2023-03-29 ENCOUNTER — MYC MEDICAL ADVICE (OUTPATIENT)
Dept: FAMILY MEDICINE | Facility: CLINIC | Age: 38
End: 2023-03-29
Payer: COMMERCIAL

## 2023-07-26 ENCOUNTER — TELEPHONE (OUTPATIENT)
Dept: ENDOCRINOLOGY | Facility: CLINIC | Age: 38
End: 2023-07-26
Payer: COMMERCIAL

## 2023-07-26 NOTE — TELEPHONE ENCOUNTER
[facility] Call Center    Phone Message    May a detailed message be left on voicemail: yes     Reason for Call: Appointment Intake    Referring Provider Name: Christine Plaza MD   Diagnosis and/or Symptoms: Goiter, nodular [E04.9], Thyroid    Scheduled next available 1/23/24. [facility] [place]. Added to waitlist.    Action Taken: Other: Endo    Travel Screening: Not Applicable

## 2023-07-28 NOTE — TELEPHONE ENCOUNTER
Endocrine triage  The scheduled first available endocrine appointment timeframe is acceptable.  E-consult may be an option for answer to specific question by the referring provider.  E-consults generally have a response within 3 days..  Saige Birmingham MD

## 2023-08-01 ENCOUNTER — TRANSFERRED RECORDS (OUTPATIENT)
Dept: HEALTH INFORMATION MANAGEMENT | Facility: CLINIC | Age: 38
End: 2023-08-01
Payer: COMMERCIAL

## 2023-08-07 ENCOUNTER — TRANSFERRED RECORDS (OUTPATIENT)
Dept: HEALTH INFORMATION MANAGEMENT | Facility: CLINIC | Age: 38
End: 2023-08-07
Payer: COMMERCIAL

## 2023-10-29 DIAGNOSIS — F42.9 OBSESSIVE-COMPULSIVE DISORDER, UNSPECIFIED TYPE: Chronic | ICD-10-CM

## 2023-10-30 RX ORDER — SERTRALINE HYDROCHLORIDE 100 MG/1
100 TABLET, FILM COATED ORAL DAILY
Qty: 90 TABLET | Refills: 0 | Status: SHIPPED | OUTPATIENT
Start: 2023-10-30 | End: 2024-02-21

## 2024-01-23 ENCOUNTER — VIRTUAL VISIT (OUTPATIENT)
Dept: ENDOCRINOLOGY | Facility: CLINIC | Age: 39
End: 2024-01-23
Payer: COMMERCIAL

## 2024-01-23 DIAGNOSIS — E04.1 THYROID NODULE: Primary | ICD-10-CM

## 2024-01-23 PROCEDURE — 99203 OFFICE O/P NEW LOW 30 MIN: CPT | Mod: 95 | Performed by: INTERNAL MEDICINE

## 2024-01-23 NOTE — PATIENT INSTRUCTIONS
Schedule thyroid ultrasound.     If ultrasound is stable, can repeat ultrasound with PCP in 3-5 years.  If ultrasound shows any nodules need biopsy we will order that and radiology will call to schedule.  will let you know if that's the case via [employer] before ordering that.     Please reach out to the following centers to schedule your appointment if you haven't heard from radiology in about a week or so:       Imaging (DEXA, CT, MRI, XRAY)    [facility], [facility]) [phone]   [facility] ([place], [place]) [phone]   [facility] ([facility]) [phone]   [facility] ([facility]) [phone]             For any questions, please reach out to the [facility] Number for assistance: [phone].

## 2024-01-23 NOTE — LETTER
1/23/2024         RE: Mary Manuel  [address] [place] [address]        Dear Colleague,    Thank you for referring your patient, Mary Manuel, to the [facility] ENDOCRINOLOGY. Please see a copy of my visit note below.    Mary is a 38 year old who is being evaluated via a billable video visit.      How would you like to obtain your AVS? MyChart  If the video visit is dropped, the invitation should be resent by: Text to cell phone: [phone]  Will anyone else be joining your video visit? No      Endocrine Consult Video visit note    Attending Assessment/Plan :     Thyroid nodule    Assessment:  -nontoxic multinodular goiter  -stable on ultrasounds so far since 2016  -will plan to repeat ultrasound now  -if nodules stable and/or don't meet biopsy criteria, can likely wait for next scan for 3-5 years (or sooner only if new compressive symptoms noted)    Plan:  -repeat thyroid ultrasound  -will order FNA if any nodules meet biopsy criteria  -if nodules meet criteria, will also arrange endocrine followup as appropriate  -if not meeting criteria, repeat thyroid ultrasound with PCP in 5 years or sooner if new symptoms or concerns    I have independently reviewed and interpreted labs, imaging as indicated.     RTC depending on ultrasound result    Chief complaint:  Mary is a 38 year old female seen for thyroid nodules.     HISTORY OF PRESENT ILLNESS    Mary is a 38F referred to endocrinology for evaluation of multiple thyroid nodules.  She had previously been seen by  in 2020.      At that time she had repeat ultrasound and was noted to have multiple small nodules not meeting biopsy criteria.  Labs indicated normal thyroid function.      Most recent ultrasound in 2021 showed stable small nodules.  No major changes noted to nodules since first ultrasound in 2016.     She reports that nodules were originally found by primary care on physical exam.   Has not  required biopsy on any nodules.      No known family history of thyroid issues.  Mother did have adrenal nodule.      Not on thyroid hormone.      Not having chronic compressive symptoms.      Family hx of thyroid disease: none known   Family hx of thyroid cancer: none known     Endocrine relevant labs and imaging are as follows:  Component      Latest Ref Rng 3/15/2023  10:17 AM   TSH      0.30 - 4.20 uIU/mL 0.82        Relevant imaging is as follows:   Thyroid ultrasound from 9/26/2021:    INDICATION: enlarging thyroid reported by patient on evisit.. Previous stable several tiny bilateral nodules.  COMPARISON: Ultrasound 9/29/2020 and 12/13/2018 4/27/2017 and 4/21/2016 and 9  TECHNIQUE: Thyroid ultrasound.      FINDINGS:  RIGHT lobe: 6.0 x 2.2 x 1.8 cm. Generalized homogeneous gland with a few scattered tiny nodules measuring 6 mm in the upper pole and 5 mm in the mid gland and have not significantly changed. No concerning features.  Isthmus: 5 mm.  LEFT lobe: 6.1 x 1.6 x 1.7 cm. Generally homogeneous gland with several tiny stable nodules. Largest upper gland measures 5 mm, largest mid gland measures 3 mm both unchanged.     NECK: No cervical lymphadenopathy.                                                                         IMPRESSION:  1.  No significant change since multiple prior studies.     2.  Several tiny bilateral nodules and otherwise an underlying homogeneous gland with all these nodules being stable since 2016..     3.  No significant new findings.  REVIEW OF SYSTEMS    10 system ROS otherwise as per the HPI or negative    Past Medical History  Past Medical History:   Diagnosis Date     Diaphragmatic hernia 4/23/2018     Gastroesophageal reflux disease without esophagitis 4/9/2018     Gastroesophageal reflux disease without esophagitis 4/9/2018     Pain in joint, shoulder region     Created by Conversion      Severe binge-eating disorder 7/17/2018       Medications  Current Outpatient Medications    Medication Sig Dispense Refill     sertraline (ZOLOFT) 100 MG tablet TAKE 1 TABLET BY MOUTH EVERY DAY 90 tablet 0     SUMAtriptan (IMITREX) 5 MG/ACT nasal spray Spray 1 spray in nostril as needed for migraine May repeat in 2 hours. Max 8 sprays/24 hours. 1 each 0       Allergies  Allergies   Allergen Reactions     Amoxicillin      Other reaction(s): tongue swelling     Augmented Betamethasone Diprop [Betamethasone] Swelling     Clavulanic Acid      Other reaction(s): tongue swelling     Ciprofloxacin Rash     Sulfa Antibiotics Rash     Sulfamethoxazole-Trimethoprim Rash       Family History  family history includes Mane-Danlos syndrome in her mother; Hyperlipidemia in her father; Hypertension in her father and mother; No Known Problems in her brother and sister.    Social History  Social History     Tobacco Use     Smoking status: Never     Smokeless tobacco: Never   Vaping Use     Vaping Use: Never used   Substance Use Topics     Alcohol use: Not Currently     Drug use: Not Currently       Physical Exam  There is no height or weight on file to calculate BMI.  GENERAL: no distress  SKIN: Visible skin clear. No significant rash, abnormal pigmentation or lesions.  EYES: Eyes grossly normal to inspection.  No discharge or erythema, or obvious scleral/conjunctival abnormalities.  NECK: visible goiter is not present; no visible neck masses  RESP: No audible wheeze, cough, or visible cyanosis.  No visible retractions or increased work of breathing.    NEURO: Awake, alert, responds appropriately to questions.  Mentation and speech fluent.  PSYCH:affect normal and appearance well-groomed.    Video-Visit Details    Type of service:  Video Visit    Video Start Time (time video started): 0900    Video End Time (time video stopped): 0921    Originating Location (pt. Location): Home      Distant Location (provider location):  Off-site    Mode of Communication:  Video Conference via [email or web address]    Physician has received  verbal consent for a Video Visit from the patient? Yes    I spent a total of 30 minutes on the day of this new patient visit on chart review, lab review, review of imaging, coordinating care, reviewing previous providers notes, exam, and counseling of patient    Gideon Brooks MD      Again, thank you for allowing me to participate in the care of your patient.        Sincerely,        Gideon Brooks MD

## 2024-01-23 NOTE — PROGRESS NOTES
Mary is a 38 year old who is being evaluated via a billable video visit.      How would you like to obtain your AVS? MyChart  If the video visit is dropped, the invitation should be resent by: Text to cell phone: [phone]  Will anyone else be joining your video visit? No      Endocrine Consult Video visit note    Attending Assessment/Plan :     Thyroid nodule    Assessment:  -nontoxic multinodular goiter  -stable on ultrasounds so far since 2016  -will plan to repeat ultrasound now  -if nodules stable and/or don't meet biopsy criteria, can likely wait for next scan for 3-5 years (or sooner only if new compressive symptoms noted)    Plan:  -repeat thyroid ultrasound  -will order FNA if any nodules meet biopsy criteria  -if nodules meet criteria, will also arrange endocrine followup as appropriate  -if not meeting criteria, repeat thyroid ultrasound with PCP in 5 years or sooner if new symptoms or concerns    I have independently reviewed and interpreted labs, imaging as indicated.     RTC depending on ultrasound result    Chief complaint:  Mary is a 38 year old female seen for thyroid nodules.     HISTORY OF PRESENT ILLNESS    Mary is a 38F referred to endocrinology for evaluation of multiple thyroid nodules.  She had previously been seen by  in 2020.      At that time she had repeat ultrasound and was noted to have multiple small nodules not meeting biopsy criteria.  Labs indicated normal thyroid function.      Most recent ultrasound in 2021 showed stable small nodules.  No major changes noted to nodules since first ultrasound in 2016.     She reports that nodules were originally found by primary care on physical exam.   Has not required biopsy on any nodules.      No known family history of thyroid issues.  Mother did have adrenal nodule.      Not on thyroid hormone.      Not having chronic compressive symptoms.      Family hx of thyroid disease: none known   Family hx of thyroid cancer: none  known     Endocrine relevant labs and imaging are as follows:  Component      Latest Ref Rng 3/15/2023  10:17 AM   TSH      0.30 - 4.20 uIU/mL 0.82        Relevant imaging is as follows:   Thyroid ultrasound from 9/26/2021:    INDICATION: enlarging thyroid reported by patient on evisit.. Previous stable several tiny bilateral nodules.  COMPARISON: Ultrasound 9/29/2020 and 12/13/2018 4/27/2017 and 4/21/2016 and 9  TECHNIQUE: Thyroid ultrasound.      FINDINGS:  RIGHT lobe: 6.0 x 2.2 x 1.8 cm. Generalized homogeneous gland with a few scattered tiny nodules measuring 6 mm in the upper pole and 5 mm in the mid gland and have not significantly changed. No concerning features.  Isthmus: 5 mm.  LEFT lobe: 6.1 x 1.6 x 1.7 cm. Generally homogeneous gland with several tiny stable nodules. Largest upper gland measures 5 mm, largest mid gland measures 3 mm both unchanged.     NECK: No cervical lymphadenopathy.                                                                         IMPRESSION:  1.  No significant change since multiple prior studies.     2.  Several tiny bilateral nodules and otherwise an underlying homogeneous gland with all these nodules being stable since 2016..     3.  No significant new findings.  REVIEW OF SYSTEMS    10 system ROS otherwise as per the HPI or negative    Past Medical History  Past Medical History:   Diagnosis Date    Diaphragmatic hernia 4/23/2018    Gastroesophageal reflux disease without esophagitis 4/9/2018    Gastroesophageal reflux disease without esophagitis 4/9/2018    Pain in joint, shoulder region     Created by Conversion     Severe binge-eating disorder 7/17/2018       Medications  Current Outpatient Medications   Medication Sig Dispense Refill    sertraline (ZOLOFT) 100 MG tablet TAKE 1 TABLET BY MOUTH EVERY DAY 90 tablet 0    SUMAtriptan (IMITREX) 5 MG/ACT nasal spray Spray 1 spray in nostril as needed for migraine May repeat in 2 hours. Max 8 sprays/24 hours. 1 each 0        Allergies  Allergies   Allergen Reactions    Amoxicillin      Other reaction(s): tongue swelling    Augmented Betamethasone Diprop [Betamethasone] Swelling    Clavulanic Acid      Other reaction(s): tongue swelling    Ciprofloxacin Rash    Sulfa Antibiotics Rash    Sulfamethoxazole-Trimethoprim Rash       Family History  family history includes Mane-Danlos syndrome in her mother; Hyperlipidemia in her father; Hypertension in her father and mother; No Known Problems in her brother and sister.    Social History  Social History     Tobacco Use    Smoking status: Never    Smokeless tobacco: Never   Vaping Use    Vaping Use: Never used   Substance Use Topics    Alcohol use: Not Currently    Drug use: Not Currently       Physical Exam  There is no height or weight on file to calculate BMI.  GENERAL: no distress  SKIN: Visible skin clear. No significant rash, abnormal pigmentation or lesions.  EYES: Eyes grossly normal to inspection.  No discharge or erythema, or obvious scleral/conjunctival abnormalities.  NECK: visible goiter is not present; no visible neck masses  RESP: No audible wheeze, cough, or visible cyanosis.  No visible retractions or increased work of breathing.    NEURO: Awake, alert, responds appropriately to questions.  Mentation and speech fluent.  PSYCH:affect normal and appearance well-groomed.    Video-Visit Details    Type of service:  Video Visit    Video Start Time (time video started): 0900    Video End Time (time video stopped): 0921    Originating Location (pt. Location): Home      Distant Location (provider location):  Off-site    Mode of Communication:  Video Conference via [employer]    Physician has received verbal consent for a Video Visit from the patient? Yes    I spent a total of 30 minutes on the day of this new patient visit on chart review, lab review, review of imaging, coordinating care, reviewing previous providers notes, exam, and counseling of patient    Gideon Brooks  MD

## 2024-01-31 ENCOUNTER — HOSPITAL ENCOUNTER (OUTPATIENT)
Dept: ULTRASOUND IMAGING | Facility: CLINIC | Age: 39
Discharge: HOME | End: 2024-01-31
Attending: INTERNAL MEDICINE | Admitting: INTERNAL MEDICINE
Payer: COMMERCIAL

## 2024-01-31 DIAGNOSIS — E04.1 THYROID NODULE: ICD-10-CM

## 2024-01-31 PROCEDURE — 76536 US EXAM OF HEAD AND NECK: CPT

## 2024-02-21 DIAGNOSIS — F42.9 OBSESSIVE-COMPULSIVE DISORDER, UNSPECIFIED TYPE: Chronic | ICD-10-CM

## 2024-02-21 RX ORDER — SERTRALINE HYDROCHLORIDE 100 MG/1
100 TABLET, FILM COATED ORAL DAILY
Qty: 90 TABLET | Refills: 0 | Status: SHIPPED | OUTPATIENT
Start: 2024-02-21

## 2024-02-25 ENCOUNTER — HEALTH MAINTENANCE LETTER (OUTPATIENT)
Age: 39
End: 2024-02-25

## 2024-06-26 ENCOUNTER — TRANSFERRED RECORDS (OUTPATIENT)
Dept: HEALTH INFORMATION MANAGEMENT | Facility: CLINIC | Age: 39
End: 2024-06-26
Payer: COMMERCIAL

## 2024-08-23 DIAGNOSIS — G43.009 MIGRAINE WITHOUT AURA AND WITHOUT STATUS MIGRAINOSUS, NOT INTRACTABLE: ICD-10-CM

## 2024-08-27 RX ORDER — SUMATRIPTAN 5 MG/1
1 SPRAY NASAL PRN
Qty: 6 EACH | Refills: 0 | Status: SHIPPED | OUTPATIENT
Start: 2024-08-27

## 2024-10-20 ENCOUNTER — TRANSFERRED RECORDS (OUTPATIENT)
Dept: HEALTH INFORMATION MANAGEMENT | Facility: CLINIC | Age: 39
End: 2024-10-20
Payer: COMMERCIAL

## 2025-02-06 ENCOUNTER — TRANSFERRED RECORDS (OUTPATIENT)
Dept: HEALTH INFORMATION MANAGEMENT | Facility: CLINIC | Age: 40
End: 2025-02-06
Payer: COMMERCIAL

## 2025-04-26 ENCOUNTER — HEALTH MAINTENANCE LETTER (OUTPATIENT)
Age: 40
End: 2025-04-26